# Patient Record
Sex: MALE | Race: ASIAN | Employment: UNEMPLOYED | URBAN - METROPOLITAN AREA
[De-identification: names, ages, dates, MRNs, and addresses within clinical notes are randomized per-mention and may not be internally consistent; named-entity substitution may affect disease eponyms.]

---

## 2019-06-24 ENCOUNTER — HOSPITAL ENCOUNTER (EMERGENCY)
Age: 49
Discharge: HOME | End: 2019-06-24
Payer: MEDICAID

## 2019-06-24 ENCOUNTER — APPOINTMENT (OUTPATIENT)
Dept: CT IMAGING | Age: 49
End: 2019-06-24
Payer: MEDICAID

## 2019-06-24 VITALS
SYSTOLIC BLOOD PRESSURE: 120 MMHG | DIASTOLIC BLOOD PRESSURE: 85 MMHG | WEIGHT: 115 LBS | TEMPERATURE: 97.5 F | BODY MASS INDEX: 20.38 KG/M2 | OXYGEN SATURATION: 99 % | HEIGHT: 63 IN | HEART RATE: 55 BPM | RESPIRATION RATE: 14 BRPM

## 2019-06-24 DIAGNOSIS — R74.8 ELEVATED LIPASE: ICD-10-CM

## 2019-06-24 DIAGNOSIS — G89.29 CHRONIC ABDOMINAL PAIN: ICD-10-CM

## 2019-06-24 DIAGNOSIS — K76.9 LESION OF LIVER: ICD-10-CM

## 2019-06-24 DIAGNOSIS — G44.209 TENSION HEADACHE: Primary | ICD-10-CM

## 2019-06-24 DIAGNOSIS — R10.9 CHRONIC ABDOMINAL PAIN: ICD-10-CM

## 2019-06-24 LAB
A/G RATIO: 1.6 (ref 1.1–2.2)
ALBUMIN SERPL-MCNC: 3.9 G/DL (ref 3.4–5)
ALP BLD-CCNC: 74 U/L (ref 40–129)
ALT SERPL-CCNC: 25 U/L (ref 10–40)
ANION GAP SERPL CALCULATED.3IONS-SCNC: 12 MMOL/L (ref 3–16)
AST SERPL-CCNC: 30 U/L (ref 15–37)
BASOPHILS ABSOLUTE: 0.1 K/UL (ref 0–0.2)
BASOPHILS RELATIVE PERCENT: 0.8 %
BILIRUB SERPL-MCNC: 0.4 MG/DL (ref 0–1)
BUN BLDV-MCNC: 32 MG/DL (ref 7–20)
CALCIUM SERPL-MCNC: 9 MG/DL (ref 8.3–10.6)
CHLORIDE BLD-SCNC: 104 MMOL/L (ref 99–110)
CO2: 24 MMOL/L (ref 21–32)
CREAT SERPL-MCNC: 1.3 MG/DL (ref 0.9–1.3)
EOSINOPHILS ABSOLUTE: 0.3 K/UL (ref 0–0.6)
EOSINOPHILS RELATIVE PERCENT: 4.3 %
GFR AFRICAN AMERICAN: >60
GFR NON-AFRICAN AMERICAN: 59
GLOBULIN: 2.4 G/DL
GLUCOSE BLD-MCNC: 79 MG/DL (ref 70–99)
HCT VFR BLD CALC: 44.5 % (ref 40.5–52.5)
HEMOGLOBIN: 15.4 G/DL (ref 13.5–17.5)
LIPASE: 88 U/L (ref 13–60)
LYMPHOCYTES ABSOLUTE: 2.8 K/UL (ref 1–5.1)
LYMPHOCYTES RELATIVE PERCENT: 36.4 %
MCH RBC QN AUTO: 32.5 PG (ref 26–34)
MCHC RBC AUTO-ENTMCNC: 34.5 G/DL (ref 31–36)
MCV RBC AUTO: 94.2 FL (ref 80–100)
MONOCYTES ABSOLUTE: 0.5 K/UL (ref 0–1.3)
MONOCYTES RELATIVE PERCENT: 6.4 %
NEUTROPHILS ABSOLUTE: 4.1 K/UL (ref 1.7–7.7)
NEUTROPHILS RELATIVE PERCENT: 52.1 %
PDW BLD-RTO: 13 % (ref 12.4–15.4)
PLATELET # BLD: 175 K/UL (ref 135–450)
PMV BLD AUTO: 7.8 FL (ref 5–10.5)
POTASSIUM REFLEX MAGNESIUM: 3.9 MMOL/L (ref 3.5–5.1)
RBC # BLD: 4.73 M/UL (ref 4.2–5.9)
SODIUM BLD-SCNC: 140 MMOL/L (ref 136–145)
TOTAL PROTEIN: 6.3 G/DL (ref 6.4–8.2)
WBC # BLD: 7.8 K/UL (ref 4–11)

## 2019-06-24 PROCEDURE — 6360000004 HC RX CONTRAST MEDICATION: Performed by: PHYSICIAN ASSISTANT

## 2019-06-24 PROCEDURE — 74177 CT ABD & PELVIS W/CONTRAST: CPT

## 2019-06-24 PROCEDURE — 83690 ASSAY OF LIPASE: CPT

## 2019-06-24 PROCEDURE — 6360000002 HC RX W HCPCS: Performed by: PHYSICIAN ASSISTANT

## 2019-06-24 PROCEDURE — 99284 EMERGENCY DEPT VISIT MOD MDM: CPT

## 2019-06-24 PROCEDURE — 80053 COMPREHEN METABOLIC PANEL: CPT

## 2019-06-24 PROCEDURE — 85025 COMPLETE CBC W/AUTO DIFF WBC: CPT

## 2019-06-24 PROCEDURE — 96374 THER/PROPH/DIAG INJ IV PUSH: CPT

## 2019-06-24 RX ORDER — KETOROLAC TROMETHAMINE 30 MG/ML
15 INJECTION, SOLUTION INTRAMUSCULAR; INTRAVENOUS ONCE
Status: COMPLETED | OUTPATIENT
Start: 2019-06-24 | End: 2019-06-24

## 2019-06-24 RX ADMIN — IOPAMIDOL 75 ML: 755 INJECTION, SOLUTION INTRAVENOUS at 16:16

## 2019-06-24 RX ADMIN — KETOROLAC TROMETHAMINE 15 MG: 30 INJECTION, SOLUTION INTRAMUSCULAR at 15:42

## 2019-06-24 SDOH — HEALTH STABILITY: MENTAL HEALTH: HOW OFTEN DO YOU HAVE A DRINK CONTAINING ALCOHOL?: NEVER

## 2019-06-24 ASSESSMENT — ENCOUNTER SYMPTOMS
ABDOMINAL PAIN: 1
SHORTNESS OF BREATH: 0
BLOOD IN STOOL: 0
NAUSEA: 0
CONSTIPATION: 0
ABDOMINAL DISTENTION: 0
ANAL BLEEDING: 0
BACK PAIN: 0
COLOR CHANGE: 0
COUGH: 0
DIARRHEA: 0
VOMITING: 0
PHOTOPHOBIA: 0

## 2019-06-24 ASSESSMENT — PAIN DESCRIPTION - ORIENTATION: ORIENTATION: LEFT;LOWER

## 2019-06-24 ASSESSMENT — PAIN SCALES - GENERAL: PAINLEVEL_OUTOF10: 10

## 2019-06-24 ASSESSMENT — PAIN DESCRIPTION - LOCATION: LOCATION: HEAD;ABDOMEN

## 2019-06-24 NOTE — ED PROVIDER NOTES
radiologist. Emely BECK PA have directly visualized the radiologic plain film image(s) with the below findings:  Interpretation per the Radiologist below, if available at the time of this note:    CT ABDOMEN PELVIS W IV CONTRAST Additional Contrast? None   Final Result   No acute abnormality identified. No imaging evidence for acute pancreatitis. Hyperenhancing liver lesion in the dome of the left hepatic lobe. This may   represent a benign perfusion abnormality versus flash filling hemangioma. If   further evaluation is necessary, contrast-enhanced MRI would be the   recommended imaging evaluation. MEDICAL DECISION MAKING / ED COURSE:      PROCEDURES:   Procedures    Patient was given:  Medications   ketorolac (TORADOL) injection 15 mg (15 mg Intravenous Given 6/24/19 1542)   iopamidol (ISOVUE-370) 76 % injection 75 mL (75 mLs Intravenous Given 6/24/19 1616)   This is a  54 y.o. male who presents to the emergency department with complaints of headache that started 3 days ago off and on. As well as pain in his left upper abdomen ongoing for 8 years. Patient was in the emergency department visiting a family friend and thought he would also get checked out at this time. Patient speaks Arlean Ligas but has a friend at bedside for translation and patient is also agreeable to this. Denies any fevers, chills, visual changes, numbness, tingling or weakness. Patient is sleeping at time of initial evaluation. He is active and smiling. Patient denies any medical problems. He states that he did take medication for headache was unsure what he took. This is not patient's worst headache of his life. In regards to his abdominal pain this is been going on for 8 years, states that he is never had this checked out. On exam he does laugh with palpation to his abdomen. Denies vomiting, diarrhea, constipation or urinary symptoms.   Denies any aggravating or alleviating factors in regards to both headache and abdominal pain. On exam patient is well-appearing, he is sleeping on initial evaluation but is also smiling afterwards. On abdominal exam he does laugh. I do not feel any mass and he has negative peritoneal signs. Vitals are all currently stable. Patient has been taking medicine for headache but unsure what this medicine is. Patient will receive IV Toradol. Will check CBC, chemistry and lipase. He has normal range of motion of his neck and no evidence of neck stiffness. He is afebrile low suspicion for meningitis. Low suspicion for intracranial bleeding, neurologically intact. I do not believe any neuro imaging is indicated at this time. I do believe patient just wanted to be checked out since he was here in the hospital with family friend. In regards to his abdominal pain going on for 8 years with normal blood work I do not believe any imaging is indicated as this is something that is chronic. Patient will refer to PCP for outpatient follow-up. Patient is otherwise well-appearing prior to discharge. The patient tolerated their visit well. I have evaluated the patient with physician available for consultation as needed. I have discussed the findings of today's workup with the patient and addressed the patient's questions and concerns. Important warning signs as well as new or worsening symptoms which wouldnecessitate immediate return to the ED were discussed. The plan is to discharge from the ED at this time, and the patient is in stable condition. The patient acknowledged understanding is agreeable with this plan. CLINICAL IMPRESSION:  1. Tension headache    2. Chronic abdominal pain    3. Lesion of liver    4. Elevated lipase        DISPOSITION Decision To Discharge 06/24/2019 04:41:23 PM      PATIENT REFERRED TO:  No follow-up provider specified. DISCHARGE MEDICATIONS:  There are no discharge medications for this patient.              (Please note the MDM and HPI sections of

## 2019-06-24 NOTE — ED NOTES
IV started and labs obtained per order. Pt tolerated well. Pt connected to monitors and BP set to cycle. Updated pt and friend on POC; verbalized understanding. Siderails up x 2, call light within reach. Pt denies any further needs at this time. Will continue to monitor.         Marquis Hood RN  06/24/19 9965

## 2021-03-27 ENCOUNTER — HOSPITAL ENCOUNTER (EMERGENCY)
Age: 51
Discharge: HOME | End: 2021-03-28
Attending: EMERGENCY MEDICINE
Payer: MEDICAID

## 2021-03-27 DIAGNOSIS — S81.012A KNEE LACERATION, LEFT, INITIAL ENCOUNTER: Primary | ICD-10-CM
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
## 2021-03-27 DIAGNOSIS — Z23 NEED FOR TETANUS BOOSTER: ICD-10-CM

## 2021-03-27 PROCEDURE — 99284 EMERGENCY DEPT VISIT MOD MDM: CPT

## 2021-03-27 PROCEDURE — 99283 EMERGENCY DEPT VISIT LOW MDM: CPT

## 2021-03-28 ENCOUNTER — APPOINTMENT (OUTPATIENT)
Dept: GENERAL RADIOLOGY | Age: 51
End: 2021-03-28
Payer: MEDICAID

## 2021-03-28 VITALS
BODY MASS INDEX: 21.26 KG/M2 | SYSTOLIC BLOOD PRESSURE: 129 MMHG | WEIGHT: 120 LBS | RESPIRATION RATE: 18 BRPM | DIASTOLIC BLOOD PRESSURE: 78 MMHG | HEIGHT: 63 IN | OXYGEN SATURATION: 98 % | TEMPERATURE: 97.8 F | HEART RATE: 78 BPM

## 2021-03-28 PROCEDURE — 6370000000 HC RX 637 (ALT 250 FOR IP): Performed by: PHYSICIAN ASSISTANT

## 2021-03-28 PROCEDURE — 73562 X-RAY EXAM OF KNEE 3: CPT

## 2021-03-28 PROCEDURE — 90715 TDAP VACCINE 7 YRS/> IM: CPT | Performed by: PHYSICIAN ASSISTANT

## 2021-03-28 PROCEDURE — 90471 IMMUNIZATION ADMIN: CPT | Performed by: PHYSICIAN ASSISTANT

## 2021-03-28 PROCEDURE — 6360000002 HC RX W HCPCS: Performed by: PHYSICIAN ASSISTANT

## 2021-03-28 RX ORDER — IBUPROFEN 800 MG/1
800 TABLET ORAL EVERY 8 HOURS PRN
Qty: 30 TABLET | Refills: 0 | Status: SHIPPED | OUTPATIENT
Start: 2021-03-28

## 2021-03-28 RX ORDER — CEPHALEXIN 500 MG/1
500 CAPSULE ORAL 4 TIMES DAILY
Qty: 28 CAPSULE | Refills: 0 | Status: SHIPPED | OUTPATIENT
Start: 2021-03-28 | End: 2021-04-04

## 2021-03-28 RX ORDER — SULFAMETHOXAZOLE AND TRIMETHOPRIM 800; 160 MG/1; MG/1
1 TABLET ORAL 2 TIMES DAILY
Qty: 14 TABLET | Refills: 0 | Status: SHIPPED | OUTPATIENT
Start: 2021-03-28 | End: 2021-04-04

## 2021-03-28 RX ORDER — CEPHALEXIN 250 MG/1
500 CAPSULE ORAL ONCE
Status: COMPLETED | OUTPATIENT
Start: 2021-03-28 | End: 2021-03-28

## 2021-03-28 RX ORDER — IBUPROFEN 600 MG/1
600 TABLET ORAL ONCE
Status: COMPLETED | OUTPATIENT
Start: 2021-03-28 | End: 2021-03-28

## 2021-03-28 RX ORDER — ACETAMINOPHEN 325 MG/1
650 TABLET ORAL ONCE
Status: COMPLETED | OUTPATIENT
Start: 2021-03-28 | End: 2021-03-28

## 2021-03-28 RX ORDER — SULFAMETHOXAZOLE AND TRIMETHOPRIM 800; 160 MG/1; MG/1
1 TABLET ORAL ONCE
Status: COMPLETED | OUTPATIENT
Start: 2021-03-28 | End: 2021-03-28

## 2021-03-28 RX ADMIN — SULFAMETHOXAZOLE AND TRIMETHOPRIM 1 TABLET: 800; 160 TABLET ORAL at 01:48

## 2021-03-28 RX ADMIN — TETANUS TOXOID, REDUCED DIPHTHERIA TOXOID AND ACELLULAR PERTUSSIS VACCINE, ADSORBED 0.5 ML: 5; 2.5; 8; 8; 2.5 SUSPENSION INTRAMUSCULAR at 01:48

## 2021-03-28 RX ADMIN — ACETAMINOPHEN 650 MG: 325 TABLET ORAL at 01:48

## 2021-03-28 RX ADMIN — IBUPROFEN 600 MG: 600 TABLET, FILM COATED ORAL at 01:50

## 2021-03-28 RX ADMIN — CEPHALEXIN 500 MG: 250 CAPSULE ORAL at 01:48

## 2021-03-28 ASSESSMENT — ENCOUNTER SYMPTOMS
NAUSEA: 0
ABDOMINAL PAIN: 0
VOMITING: 0
SHORTNESS OF BREATH: 0
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
## 2021-03-28 NOTE — ED NOTES
Crutches given and instructions given using the  line. Also instructed to follow up with wound care and to keep dressing intact to left knee till then. Also to keep knee straight.        Blessing Villatoro RN  03/28/21 1071
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
## 2021-04-05 ENCOUNTER — HOSPITAL ENCOUNTER (OUTPATIENT)
Dept: WOUND CARE | Age: 51
Discharge: HOME | End: 2021-04-05
Payer: MEDICAID

## 2021-04-05 VITALS
WEIGHT: 137 LBS | BODY MASS INDEX: 24.27 KG/M2 | DIASTOLIC BLOOD PRESSURE: 97 MMHG | TEMPERATURE: 97.3 F | SYSTOLIC BLOOD PRESSURE: 125 MMHG | HEART RATE: 87 BPM | RESPIRATION RATE: 16 BRPM

## 2021-04-05 DIAGNOSIS — S81.012A LACERATION OF LEFT KNEE, INITIAL ENCOUNTER: ICD-10-CM

## 2021-04-05 DIAGNOSIS — S81.002A OPEN KNEE WOUND, LEFT, INITIAL ENCOUNTER: Primary | ICD-10-CM

## 2021-04-05 PROCEDURE — 11042 DBRDMT SUBQ TIS 1ST 20SQCM/<: CPT | Performed by: NURSE PRACTITIONER

## 2021-04-05 PROCEDURE — 11042 DBRDMT SUBQ TIS 1ST 20SQCM/<: CPT

## 2021-04-05 PROCEDURE — 99213 OFFICE O/P EST LOW 20 MIN: CPT

## 2021-04-05 PROCEDURE — 99202 OFFICE O/P NEW SF 15 MIN: CPT | Performed by: NURSE PRACTITIONER

## 2021-04-05 RX ORDER — LIDOCAINE HYDROCHLORIDE 20 MG/ML
JELLY TOPICAL ONCE
Status: CANCELLED | OUTPATIENT
Start: 2021-04-05 | End: 2021-04-05

## 2021-04-05 RX ORDER — BACITRACIN, NEOMYCIN, POLYMYXIN B 400; 3.5; 5 [USP'U]/G; MG/G; [USP'U]/G
OINTMENT TOPICAL ONCE
Status: CANCELLED | OUTPATIENT
Start: 2021-04-05 | End: 2021-04-05

## 2021-04-05 RX ORDER — BETAMETHASONE DIPROPIONATE 0.05 %
OINTMENT (GRAM) TOPICAL ONCE
Status: CANCELLED | OUTPATIENT
Start: 2021-04-05 | End: 2021-04-05

## 2021-04-05 RX ORDER — BACITRACIN ZINC AND POLYMYXIN B SULFATE 500; 1000 [USP'U]/G; [USP'U]/G
OINTMENT TOPICAL ONCE
Status: CANCELLED | OUTPATIENT
Start: 2021-04-05 | End: 2021-04-05

## 2021-04-05 RX ORDER — GINSENG 100 MG
CAPSULE ORAL ONCE
Status: CANCELLED | OUTPATIENT
Start: 2021-04-05 | End: 2021-04-05

## 2021-04-05 RX ORDER — LIDOCAINE HYDROCHLORIDE 40 MG/ML
SOLUTION TOPICAL ONCE
Status: CANCELLED | OUTPATIENT
Start: 2021-04-05 | End: 2021-04-05

## 2021-04-05 RX ORDER — LIDOCAINE 50 MG/G
OINTMENT TOPICAL ONCE
Status: CANCELLED | OUTPATIENT
Start: 2021-04-05 | End: 2021-04-05

## 2021-04-05 RX ORDER — GENTAMICIN SULFATE 1 MG/G
OINTMENT TOPICAL ONCE
Status: CANCELLED | OUTPATIENT
Start: 2021-04-05 | End: 2021-04-05

## 2021-04-05 RX ORDER — CLOBETASOL PROPIONATE 0.5 MG/G
OINTMENT TOPICAL ONCE
Status: CANCELLED | OUTPATIENT
Start: 2021-04-05 | End: 2021-04-05

## 2021-04-05 RX ORDER — LIDOCAINE 40 MG/G
CREAM TOPICAL ONCE
Status: DISCONTINUED | OUTPATIENT
Start: 2021-04-05 | End: 2021-04-06 | Stop reason: HOSPADM

## 2021-04-05 RX ORDER — LIDOCAINE 40 MG/G
CREAM TOPICAL ONCE
Status: CANCELLED | OUTPATIENT
Start: 2021-04-05 | End: 2021-04-05

## 2021-04-05 NOTE — PROGRESS NOTES
[facility],[address]:      [address] f: [phone] f: [phone]: [phone] [email or web address]     Ordering Center: Mauricio Lees [address]  [facility] [address]  [phone]  Dept: [phone]   Fax# [phone]    Patient Information:      Joleen Sue [facility]   [phone]   : 1970  AGE: 54 y.o. GENDER: male   TODAYS DATE:  2021    Insurance:      PRIMARY INSURANCE:  Plan: [employer] COMM[employer] PLAN  Coverage: [facility]  Effective Date: 2019  Group Number: [unfilled]  Subscriber Number: [id number] - (Medicaid Managed)    Payor/Plan Subscr  Sex Relation Sub. Ins. ID Effective Group Num   1. [id number] [facility] B 1970 Male Self [id number]                                   [address]         Patient Wound Information:     Additional ICD-10 Codes:S81.002A    There is no problem list on file for this patient. WOUNDS REQUIRING DRESSING SUPPLIES:     Wound 21 Knee Anterior; Left #1 (Active)   Wound Image   21 0916   Wound Etiology Traumatic 21   Wound Cleansed Cleansed with saline 21   Wound Length (cm) 3.5 cm 21 0916   Wound Width (cm) 3.1 cm 2116   Wound Depth (cm) 0.1 cm 21   Wound Surface Area (cm^2) 10.85 cm^2 21 0916   Wound Volume (cm^3) 1.08 cm^3 21   Post-Procedure Length (cm) 3.6 cm 21   Post-Procedure Width (cm) 3.2 cm 21   Post-Procedure Depth (cm) 0.1 cm 21   Post-Procedure Surface Area (cm^2) 11.52 cm^2 21   Post-Procedure Volume (cm^3) 1.15 cm^3 21   Undermining Starts ___ O'Clock 6 21   Undermining Ends___ O'Clock 12 21   Undermining Maxium Distance (cm) 0.3 21   Wound Assessment Granulation tissue;Bleeding;Slough 21 Drainage Amount Moderate 04/05/21 0916   Drainage Description Serosanguinous 04/05/21 0916   Odor None 04/05/21 0916   Lyudmila-wound Assessment Intact 04/05/21 0916   Margins Attached edges; Defined edges 04/05/21 0916   Wound Thickness Description not for Pressure Injury Full thickness 04/05/21 0916   Number of days: 0          Supplies Requested :      WOUND #: 1   PRIMARY DRESSING:    None   Cover and Secure with: 4X4 gauze pad  Other Mepitel     FREQUENCY OF DRESSING CHANGES:  Daily    Wound Thickness [x] Full   []Partial                                     Patient Wound(s) Debrided: [x] Yes   [] No    Debridement Date: 4/5/2021    Debribement Type: Excisional/Sharp    ADDITIONAL ITEMS:  [] Gloves Small  [x] Gloves Medium [] Gloves Large [] Gloves [facility]  [] Paper Tape 1\" [] Paper Tape 2\" [] Paper Tape 3\"  [x] Medipore Tape 3\"  [x] Saline  [] Skin Prep   [] Adhesive Remover   [] Cotton Tip Applicators  [] Tubular Stocking   [] Size E  [] Size G  [] Other:    Patient currently being seen by Home Health: [] Yes   [x] No    Duration for needed supplies:  []15  [x]30  []60  []90 Days    Provider Information:      PROVIDER'S NAME/NPI  Andria Alcantara  [id number]    I give permission to coordinate the care for this patient

## 2021-04-05 NOTE — PROGRESS NOTES
SYSTEMS    Pertinent items are noted in HPI. Objective:      BP (!) 125/97   Pulse 87   Temp 97.3 °F (36.3 °C) (Temporal)   Resp 16   Wt 137 lb (62.1 kg)   BMI 24.27 kg/m²     PHYSICAL EXAM    General Appearance: alert and oriented to person, place and time, well-developed and well-nourished, in no acute distress  Skin: warm and dry, no rash or erythema  Head: normocephalic and atraumatic  Eyes: pupils equal, round, and reactive to light  Pulmonary/Chest: clear to auscultation bilaterally- no wheezes, rales or rhonchi, normal air movement, no respiratory distress  Cardiovascular: normal rate, regular rhythm and intact distal pulses  Extremities: no cyanosis, no clubbing and no edema  Wound: red, moist red wound base with depth to wound at first no noted. No pain with probing depth and granulated to red granular tissue. Assessment:     Patient Active Problem List   Diagnosis    Open knee wound, left, initial encounter    Laceration of left knee       Procedure Note    Performed by: RONNIE Zuñiga CNP    Consent obtained: Yes    Time out taken:  Yes    Pain Control: Anesthetic  Anesthetic: 4% Lidocaine Cream     Debridement:Excisional Debridement    Using curette, #15 blade scalpel and forceps the wound was sharply debrided    down through and including the removal of epidermis, dermis and subcutaneous tissue. Devitalized Tissue Debrided:  fibrin, biofilm and slough    Pre Debridement Measurements:  Are located in the Wound Documentation Flow Sheet    Wound #: 1     Post  Debridement Measurements:  Wound 04/05/21 Knee Anterior; Left #1 (Active)   Wound Image   04/05/21 0916   Wound Etiology Traumatic 04/05/21 0916   Dressing Status New dressing applied 04/05/21 1010   Wound Cleansed Cleansed with saline 04/05/21 0916   Dressing/Treatment Antibacterial ointment;Dry dressing;Non adherent 04/05/21 1010   Wound Length (cm) 3.5 cm 04/05/21 0916   Wound Width (cm) 3.1 cm 04/05/21 0916 Wound Depth (cm) 0.1 cm 04/05/21 0916   Wound Surface Area (cm^2) 10.85 cm^2 04/05/21 0916   Wound Volume (cm^3) 1.08 cm^3 04/05/21 0916   Post-Procedure Length (cm) 3.6 cm 04/05/21 0927   Post-Procedure Width (cm) 3.2 cm 04/05/21 0927   Post-Procedure Depth (cm) 0.1 cm 04/05/21 0927   Post-Procedure Surface Area (cm^2) 11.52 cm^2 04/05/21 0927   Post-Procedure Volume (cm^3) 1.15 cm^3 04/05/21 0927   Undermining Starts ___ O'Clock 6 04/05/21 0916   Undermining Ends___ O'Clock 12 04/05/21 0916   Undermining Maxium Distance (cm) 0.3 04/05/21 0916   Wound Assessment Granulation tissue;Bleeding;Slough 04/05/21 0916   Drainage Amount Moderate 04/05/21 0916   Drainage Description Serosanguinous 04/05/21 0916   Odor None 04/05/21 0916   Lyudmila-wound Assessment Intact 04/05/21 0916   Margins Attached edges; Defined edges 04/05/21 0916   Wound Thickness Description not for Pressure Injury Full thickness 04/05/21 0916   Number of days: 0           Total Surface Area Debrided:  11.52 sq cm     Percentage of wound debrided 100 %    Bleeding:  Minimal    Hemostasis Achieved:  not needed    Procedural Pain:  1  / 10     Post Procedural Pain:  0 / 10     Response to treatment:  Well tolerated by patient. Plan:     The nature of the patient's condition was explained in depth. The patient was informed that their compliance to the treatment plan is paramount to successful healing and prevention of further ulceration and/or infection. Pt/family education per provider via  related to moist wound healing and current plan of care. All agreeable to plan and will come back next week for follow-up     Discharge Treatment Wound 04/05/21 Knee Anterior; Left #1-Dressing/Treatment: Antibacterial ointment, Dry dressing, Non adherent    Written Patient Discharge Instructions Given            Electronically signed by RONNIE Power CNP on 4/5/2021 at 11:06 AM

## 2021-04-05 NOTE — PLAN OF CARE
Discharge instructions given. Patient verbalized understanding. Return to [facility] in 1 week.   Called/faxed orders to

## 2021-04-12 ENCOUNTER — HOSPITAL ENCOUNTER (OUTPATIENT)
Dept: WOUND CARE | Age: 51
Discharge: HOME | End: 2021-04-12
Payer: MEDICAID

## 2022-12-03 ENCOUNTER — APPOINTMENT (OUTPATIENT)
Dept: GENERAL RADIOLOGY | Age: 52
End: 2022-12-03
Payer: COMMERCIAL

## 2022-12-03 ENCOUNTER — HOSPITAL ENCOUNTER (EMERGENCY)
Age: 52
Discharge: HOME | End: 2022-12-03
Attending: EMERGENCY MEDICINE
Payer: COMMERCIAL

## 2022-12-03 ENCOUNTER — APPOINTMENT (OUTPATIENT)
Dept: CT IMAGING | Age: 52
End: 2022-12-03
Payer: COMMERCIAL

## 2022-12-03 VITALS
WEIGHT: 140 LBS | HEIGHT: 61 IN | HEART RATE: 93 BPM | TEMPERATURE: 97.9 F | DIASTOLIC BLOOD PRESSURE: 89 MMHG | BODY MASS INDEX: 26.43 KG/M2 | OXYGEN SATURATION: 100 % | RESPIRATION RATE: 20 BRPM | SYSTOLIC BLOOD PRESSURE: 136 MMHG

## 2022-12-03 DIAGNOSIS — R56.9 SEIZURE (HCC): Primary | ICD-10-CM

## 2022-12-03 DIAGNOSIS — E87.6 HYPOKALEMIA: ICD-10-CM

## 2022-12-03 LAB
A/G RATIO: 1.6 (ref 1.1–2.2)
ALBUMIN SERPL-MCNC: 4.1 G/DL (ref 3.4–5)
ALP BLD-CCNC: 110 U/L (ref 40–129)
ALT SERPL-CCNC: 29 U/L (ref 10–40)
ANION GAP SERPL CALCULATED.3IONS-SCNC: 23 MMOL/L (ref 3–16)
AST SERPL-CCNC: 32 U/L (ref 15–37)
BACTERIA: NORMAL /HPF
BASOPHILS ABSOLUTE: 0.1 K/UL (ref 0–0.2)
BASOPHILS RELATIVE PERCENT: 0.5 %
BILIRUB SERPL-MCNC: 0.4 MG/DL (ref 0–1)
BILIRUBIN URINE: NEGATIVE
BLOOD, URINE: NEGATIVE
BUN BLDV-MCNC: 21 MG/DL (ref 7–20)
CALCIUM SERPL-MCNC: 9.5 MG/DL (ref 8.3–10.6)
CHLORIDE BLD-SCNC: 97 MMOL/L (ref 99–110)
CHP ED QC CHECK: NORMAL
CHP ED QC CHECK: NORMAL
CLARITY: CLEAR
CO2: 17 MMOL/L (ref 21–32)
COLOR: YELLOW
CREAT SERPL-MCNC: 1.4 MG/DL (ref 0.9–1.3)
EOSINOPHILS ABSOLUTE: 0.4 K/UL (ref 0–0.6)
EOSINOPHILS RELATIVE PERCENT: 3.4 %
EPITHELIAL CELLS, UA: 0 /HPF (ref 0–5)
GFR SERPL CREATININE-BSD FRML MDRD: >60 ML/MIN/{1.73_M2}
GLUCOSE BLD-MCNC: 114 MG/DL (ref 70–99)
GLUCOSE BLD-MCNC: 125 MG/DL
GLUCOSE BLD-MCNC: 125 MG/DL (ref 70–99)
GLUCOSE BLD-MCNC: 181 MG/DL
GLUCOSE BLD-MCNC: 181 MG/DL (ref 70–99)
GLUCOSE URINE: 250 MG/DL
HCT VFR BLD CALC: 44.9 % (ref 40.5–52.5)
HEMOGLOBIN: 15.2 G/DL (ref 13.5–17.5)
HYALINE CASTS: 2 /LPF (ref 0–8)
KETONES, URINE: NEGATIVE MG/DL
LEUKOCYTE ESTERASE, URINE: NEGATIVE
LYMPHOCYTES ABSOLUTE: 4.4 K/UL (ref 1–5.1)
LYMPHOCYTES RELATIVE PERCENT: 39.8 %
MAGNESIUM: 2.3 MG/DL (ref 1.8–2.4)
MCH RBC QN AUTO: 30.9 PG (ref 26–34)
MCHC RBC AUTO-ENTMCNC: 33.9 G/DL (ref 31–36)
MCV RBC AUTO: 91.3 FL (ref 80–100)
MICROSCOPIC EXAMINATION: YES
MONOCYTES ABSOLUTE: 0.7 K/UL (ref 0–1.3)
MONOCYTES RELATIVE PERCENT: 6.3 %
NEUTROPHILS ABSOLUTE: 5.5 K/UL (ref 1.7–7.7)
NEUTROPHILS RELATIVE PERCENT: 50 %
NITRITE, URINE: NEGATIVE
PDW BLD-RTO: 13.5 % (ref 12.4–15.4)
PERFORMED ON: ABNORMAL
PERFORMED ON: ABNORMAL
PH UA: 6 (ref 5–8)
PLATELET # BLD: 211 K/UL (ref 135–450)
PMV BLD AUTO: 7.9 FL (ref 5–10.5)
POTASSIUM REFLEX MAGNESIUM: 3.4 MMOL/L (ref 3.5–5.1)
PRO-BNP: 25 PG/ML (ref 0–124)
PROTEIN UA: ABNORMAL MG/DL
RBC # BLD: 4.92 M/UL (ref 4.2–5.9)
RBC UA: 0 /HPF (ref 0–4)
SODIUM BLD-SCNC: 137 MMOL/L (ref 136–145)
SPECIFIC GRAVITY UA: 1.02 (ref 1–1.03)
TOTAL CK: 194 U/L (ref 39–308)
TOTAL PROTEIN: 6.6 G/DL (ref 6.4–8.2)
TROPONIN: <0.01 NG/ML
URINE REFLEX TO CULTURE: ABNORMAL
URINE TYPE: ABNORMAL
UROBILINOGEN, URINE: 0.2 E.U./DL
WBC # BLD: 10.9 K/UL (ref 4–11)
WBC UA: 0 /HPF (ref 0–5)

## 2022-12-03 PROCEDURE — 99285 EMERGENCY DEPT VISIT HI MDM: CPT

## 2022-12-03 PROCEDURE — 6370000000 HC RX 637 (ALT 250 FOR IP): Performed by: PHYSICIAN ASSISTANT

## 2022-12-03 PROCEDURE — 36415 COLL VENOUS BLD VENIPUNCTURE: CPT

## 2022-12-03 PROCEDURE — 85025 COMPLETE CBC W/AUTO DIFF WBC: CPT

## 2022-12-03 PROCEDURE — 81001 URINALYSIS AUTO W/SCOPE: CPT

## 2022-12-03 PROCEDURE — 93005 ELECTROCARDIOGRAM TRACING: CPT | Performed by: PHYSICIAN ASSISTANT

## 2022-12-03 PROCEDURE — 80053 COMPREHEN METABOLIC PANEL: CPT

## 2022-12-03 PROCEDURE — 82550 ASSAY OF CK (CPK): CPT

## 2022-12-03 PROCEDURE — 83735 ASSAY OF MAGNESIUM: CPT

## 2022-12-03 PROCEDURE — 70450 CT HEAD/BRAIN W/O DYE: CPT

## 2022-12-03 PROCEDURE — 84484 ASSAY OF TROPONIN QUANT: CPT

## 2022-12-03 PROCEDURE — 71045 X-RAY EXAM CHEST 1 VIEW: CPT

## 2022-12-03 PROCEDURE — 83880 ASSAY OF NATRIURETIC PEPTIDE: CPT

## 2022-12-03 RX ORDER — ATORVASTATIN CALCIUM 40 MG/1
TABLET, FILM COATED ORAL
COMMUNITY
Start: 2022-11-17

## 2022-12-03 RX ADMIN — POTASSIUM BICARBONATE 20 MEQ: 782 TABLET, EFFERVESCENT ORAL at 17:18

## 2022-12-03 ASSESSMENT — ENCOUNTER SYMPTOMS
CONSTIPATION: 0
BACK PAIN: 0
COUGH: 0
VOMITING: 0
SHORTNESS OF BREATH: 0
NAUSEA: 0
PHOTOPHOBIA: 0
COLOR CHANGE: 0
DIARRHEA: 0
ABDOMINAL PAIN: 0
RESPIRATORY NEGATIVE: 1
CHEST TIGHTNESS: 0

## 2022-12-03 ASSESSMENT — PAIN - FUNCTIONAL ASSESSMENT: PAIN_FUNCTIONAL_ASSESSMENT: NONE - DENIES PAIN

## 2022-12-03 ASSESSMENT — LIFESTYLE VARIABLES: HOW OFTEN DO YOU HAVE A DRINK CONTAINING ALCOHOL: NEVER

## 2022-12-03 NOTE — ED PROVIDER NOTES
I independently performed a history and physical on Tyler Smiley. All diagnostic, treatment, and disposition decisions were made by myself in conjunction with the advanced practice provider. For further details of [facility]ald emergency department encounter, please see TANG Bashir's documentation. Patient is here because of a seizure that occurred during mealtime. Patient reportedly began shaking and then lost consciousness and fell over. EMS reports the patient was postictal on their arrival with a blood glucose of 68. They interpreted this as low and gave him glucose. Here patient is awake and alert and oriented. Neurologic baseline per family. The history I received was through [place] audio  from a family member. On exam heart regular rate and rhythm and abdomen benign and patient moving all extremities purposefully and following commands. EKG  The Ekg interpreted by me shows  sinus tachycardia, agyt=204   Axis is   Right axis deviation  QTc is  normal  Intervals and Durations are unremarkable. ST Segments: normal  No prior EKG available for comparison.     Labs Reviewed   COMPREHENSIVE METABOLIC PANEL W/ REFLEX TO MG FOR LOW K - Abnormal; Notable for the following components:       Result Value    Potassium reflex Magnesium 3.4 (*)     Chloride 97 (*)     CO2 17 (*)     Anion Gap 23 (*)     Glucose 114 (*)     BUN 21 (*)     Creatinine 1.4 (*)     All other components within normal limits   URINALYSIS WITH REFLEX TO CULTURE - Abnormal; Notable for the following components:    Glucose, Ur 250 (*)     Protein, UA TRACE (*)     All other components within normal limits   POCT GLUCOSE - Abnormal; Notable for the following components:    POC Glucose 125 (*)     All other components within normal limits   POCT GLUCOSE - Abnormal; Notable for the following components:    POC Glucose 181 (*)     All other components within normal limits   POCT GLUCOSE - Normal   POCT GLUCOSE - Normal   CK   CBC WITH AUTO DIFFERENTIAL   TROPONIN   BRAIN NATRIURETIC PEPTIDE   MAGNESIUM   MICROSCOPIC URINALYSIS   POCT GLUCOSE   POCT GLUCOSE     CT HEAD WO CONTRAST   Final Result   No acute intracranial abnormality. XR CHEST PORTABLE   Final Result   Low lung volumes with bibasilar atelectasis. I personally saw this patient and performed a substantive portion of the visit including all aspects of the medical decision making. MDM  No clear cause found for the patient's new onset seizure disorder. I discussed with the patient and caregiver regarding the need for MRI and EEG as an outpatient. Given patient has returned to baseline I feel he is appropriate for discharge. I estimate there is LOW risk for ACUTE CORONARY SYNDROME, INTRACRANIAL HEMORRHAGE, MALIGNANT DYSRHYTHMIA, MENINGITIS, PNEUMONIA, PULMONARY EMBOLISM, SEPSIS, SUBARACHNOID HEMORRHAGE, SUBDURAL HEMATOMA, STROKE, or URINARY TRACT INFECTION, thus I consider the discharge disposition reasonable. Alfredo Jose and I have discussed the diagnosis and risks, and we agree with discharging home to follow-up with their primary doctor. We also discussed returning to the Emergency Department immediately if new or worsening symptoms occur. We have discussed the symptoms which are most concerning (e.g., changing or worsening pain, weakness, vomiting, fever) that necessitate immediate return. Final Impression    1. Seizure ([address]    2. Hypokalemia        Blood pressure 136/89, pulse 93, temperature 97.9 °F (36.6 °C), temperature source Oral, resp. rate 20, height 5' 1\" (1.549 m), weight 140 lb (63.5 kg), SpO2 100 %.        Chitra Coreas MD  12/03/22 3506

## 2022-12-04 LAB
EKG ATRIAL RATE: 107 BPM
EKG DIAGNOSIS: NORMAL
EKG P AXIS: 62 DEGREES
EKG P-R INTERVAL: 158 MS
EKG Q-T INTERVAL: 336 MS
EKG QRS DURATION: 82 MS
EKG QTC CALCULATION (BAZETT): 448 MS
EKG R AXIS: 99 DEGREES
EKG T AXIS: 53 DEGREES
EKG VENTRICULAR RATE: 107 BPM

## 2022-12-04 PROCEDURE — 93010 ELECTROCARDIOGRAM REPORT: CPT | Performed by: INTERNAL MEDICINE

## 2022-12-04 NOTE — ED PROVIDER NOTES
[facility]        Pt Name: Isaias Ruiz  MRN: [id number]  Phylicia 1970  Date of evaluation: 12/3/2022  Provider: TANG Solis  PCP: No primary care provider on file. Note Started: 11:01 PM EST 12/3/22     I have seen and evaluated this patient with my supervising physician [address]       Chief Complaint   Patient presents with    Seizures     Arrived per [facility] EMS d/t seizure like activity while at work; post ic upon EMS arrival; BS 68 with glucose up to 146;        HISTORY OF PRESENT ILLNESS   (Location, Timing/Onset, Context/Setting, Quality, Duration, Modifying Factors, Severity, Associated Signs and Symptoms)  Note limiting factors. Chief Complaint: Seizure like activity    Isaias Ruiz is a 50 y.o. male with no significant past medical history who presents to the ED with complaint of seizure-like activity. He was at work. Was sitting down for lunch when apparently he had seizure-like activity. Lasted a couple minutes. Was witnessed by staff who is at bedside with patient at this time. He apparently was postictal.  Glucose was low per EMS at 68. He was given glucose and improved to 146. He denies any complaints at this time. He is not postictal at this time. He denies any headache. No history of seizures in the past.  Denies drug or alcohol usage. Denies lightheadedness, dizziness, visual changes, speech disturbances or numbness/tingling. He denies any oral laceration or tongue bite. He denies any blood in his mouth. Denies bowel or bladder incontinence. Denies chest pain, shortness of breath, palpitations, abdominal pain, nausea/vomiting, urinary symptoms or changes in bowel movements. Denies any fever or chills. Denies any rashes or lesions. Nursing Notes were all reviewed and agreed with or any disagreements were addressed in the HPI.     REVIEW OF SYSTEMS    (2-9 systems for level 4, 10 or more for level 5)     Review of Systems   Constitutional:  Negative for activity change, appetite change, chills, diaphoresis, fatigue and fever. Eyes:  Negative for photophobia and visual disturbance. Respiratory: Negative. Negative for cough, chest tightness and shortness of breath. Cardiovascular: Negative. Negative for chest pain, palpitations and leg swelling. Gastrointestinal:  Negative for abdominal pain, constipation, diarrhea, nausea and vomiting. Genitourinary:  Negative for decreased urine volume, difficulty urinating, dysuria, flank pain, frequency, hematuria and urgency. Musculoskeletal:  Negative for arthralgias, back pain, myalgias, neck pain and neck stiffness. Skin:  Negative for color change, pallor, rash and wound. Neurological:  Positive for seizures. Negative for dizziness, tremors, syncope, facial asymmetry, speech difficulty, weakness, light-headedness, numbness and headaches. Positives and Pertinent negatives as per HPI. Except as noted above in the ROS, all other systems were reviewed and negative. PAST MEDICAL HISTORY     Past Medical History:   Diagnosis Date    Hyperlipidemia     Laceration of left knee 4/5/2021    Open knee wound, left, initial encounter 4/5/2021         SURGICAL HISTORY   History reviewed. No pertinent surgical history. [address]       Discharge Medication List as of 12/3/2022  7:41 PM        CONTINUE these medications which have NOT CHANGED    Details   atorvastatin (LIPITOR) 40 MG tablet Historical Med      ibuprofen (ADVIL;MOTRIN) 800 MG tablet Take 1 tablet by mouth every 8 hours as needed for Pain, Disp-30 tablet, R-0Print               ALLERGIES     Patient has no known allergies. FAMILYHISTORY     History reviewed. No pertinent family history.        SOCIAL HISTORY       Social History     Tobacco Use    Smoking status: Some Days    Smokeless tobacco: Never   Vaping Use    Vaping Use: Never used Substance Use Topics    Alcohol use: Never    Drug use: Never       SCREENINGS    [place] Coma Scale  Eye Opening: Spontaneous  Best Verbal Response: Oriented  Best Motor Response: Obeys commands  [place] Coma Scale Score: 15        PHYSICAL EXAM    (up to 7 for level 4, 8 or more for level 5)     ED Triage Vitals [12/03/22 1508]   BP Temp Temp Source Heart Rate Resp SpO2 Height Weight   (!) 140/97 97.9 °F (36.6 °C) Oral (!) 118 20 95 % 5' 1\" (1.549 m) 140 lb (63.5 kg)       Physical Exam  Constitutional:       General: He is not in acute distress. Appearance: Normal appearance. He is well-developed. He is not ill-appearing, toxic-appearing or diaphoretic. HENT:      Head: Normocephalic and atraumatic. Right Ear: External ear normal.      Left Ear: External ear normal.      Nose: Nose normal. No congestion or rhinorrhea. Mouth/Throat:      Mouth: Mucous membranes are moist.      Pharynx: No oropharyngeal exudate or posterior oropharyngeal erythema. Eyes:      General:         Right eye: No discharge. Left eye: No discharge. Extraocular Movements: Extraocular movements intact. Conjunctiva/sclera: Conjunctivae normal.      Pupils: Pupils are equal, round, and reactive to light. Cardiovascular:      Rate and Rhythm: Normal rate and regular rhythm. Pulses: Normal pulses. Heart sounds: Normal heart sounds. No murmur heard. No friction rub. No gallop. Comments: 2+ radial pulses bilaterally. No pedal edema. No calf tenderness. No JVD  Pulmonary:      Effort: Pulmonary effort is normal. No respiratory distress. Breath sounds: Normal breath sounds. No stridor. No wheezing, rhonchi or rales. Chest:      Chest wall: No tenderness. Abdominal:      General: Abdomen is flat. Bowel sounds are normal. There is no distension. Palpations: Abdomen is soft. There is no mass. Tenderness: There is no abdominal tenderness.  There is no right CVA tenderness, left CVA tenderness, guarding or rebound. Hernia: No hernia is present. Musculoskeletal:         General: Normal range of motion. Cervical back: Normal range of motion and neck supple. No rigidity or tenderness. Lymphadenopathy:      Cervical: No cervical adenopathy. Skin:     General: Skin is warm and dry. Coloration: Skin is not pale. Findings: No erythema or rash. Neurological:      General: No focal deficit present. Mental Status: He is alert and oriented to person, place, and time. GCS: GCS eye subscore is 4. GCS verbal subscore is 5. GCS motor subscore is 6. Cranial Nerves: Cranial nerves 2-12 are intact. No cranial nerve deficit, dysarthria or facial asymmetry. Sensory: Sensation is intact. No sensory deficit. Motor: Motor function is intact. No weakness. Psychiatric:         Behavior: Behavior normal.       DIAGNOSTIC RESULTS   LABS:    Labs Reviewed   COMPREHENSIVE METABOLIC PANEL W/ REFLEX TO MG FOR LOW K - Abnormal; Notable for the following components:       Result Value    Potassium reflex Magnesium 3.4 (*)     Chloride 97 (*)     CO2 17 (*)     Anion Gap 23 (*)     Glucose 114 (*)     BUN 21 (*)     Creatinine 1.4 (*)     All other components within normal limits   URINALYSIS WITH REFLEX TO CULTURE - Abnormal; Notable for the following components:    Glucose, Ur 250 (*)     Protein, UA TRACE (*)     All other components within normal limits   POCT GLUCOSE - Abnormal; Notable for the following components:    POC Glucose 125 (*)     All other components within normal limits   POCT GLUCOSE - Abnormal; Notable for the following components:    POC Glucose 181 (*)     All other components within normal limits   POCT GLUCOSE - Normal   POCT GLUCOSE - Normal   CK   CBC WITH AUTO DIFFERENTIAL   TROPONIN   BRAIN NATRIURETIC PEPTIDE   MAGNESIUM   MICROSCOPIC URINALYSIS   POCT GLUCOSE   POCT GLUCOSE       When ordered only abnormal lab results are displayed. All other labs were within normal range or not returned as of this dictation. EKG: When ordered, EKG's are interpreted by the Emergency Department Physician in the absence of a cardiologist.  Please see their note for interpretation of EKG. RADIOLOGY:   Non-plain film images such as CT, Ultrasound and MRI are read by the radiologist. Plain radiographic images are visualized and preliminarily interpreted by the ED Provider with the below findings:        Interpretation per the Radiologist below, if available at the time of this note:    CT HEAD WO CONTRAST   Final Result   No acute intracranial abnormality. XR CHEST PORTABLE   Final Result   Low lung volumes with bibasilar atelectasis. MRI BRAIN WO CONTRAST    (Results Pending)     CT HEAD WO CONTRAST    Result Date: 12/3/2022  EXAMINATION: CT OF THE HEAD WITHOUT CONTRAST  12/3/2022 3:55 pm TECHNIQUE: CT of the head was performed without the administration of intravenous contrast. Automated exposure control, iterative reconstruction, and/or weight based adjustment of the mA/kV was utilized to reduce the radiation dose to as low as reasonably achievable. COMPARISON: None. HISTORY: ORDERING SYSTEM PROVIDED HISTORY: [facility] TECHNOLOGIST PROVIDED HISTORY: Has a \"code stroke\" or \"stroke alert\" been called? ->No Reason for exam:->[email or web address]@ Decision Support Exception - unselect if not a suspected or confirmed emergency medical condition->Emergency Medical Condition (MA) Reason for Exam: seizure Relevant Medical/Surgical History: Seizures (Arrived per [facility] EMS d/t seizure like activity while at work; post ic upon EMS arrival; BS 68 with glucose up to 146; ) FINDINGS: BRAIN/VENTRICLES: There is no acute intracranial hemorrhage, mass effect or midline shift. No abnormal extra-axial fluid collection. The gray-white differentiation is maintained without evidence of an acute infarct. There is no evidence of hydrocephalus.  ORBITS: The visualized portion of the orbits demonstrate no acute abnormality. SINUSES: The visualized paranasal sinuses and mastoid air cells demonstrate no acute abnormality. SOFT TISSUES/SKULL:  No acute abnormality of the visualized skull or soft tissues. No acute intracranial abnormality. XR CHEST PORTABLE    Result Date: 12/3/2022  EXAMINATION: ONE XRAY VIEW OF THE CHEST 12/3/2022 3:37 pm COMPARISON: None. HISTORY: ORDERING SYSTEM PROVIDED HISTORY: seizure TECHNOLOGIST PROVIDED HISTORY: Reason for exam:->seizure Reason for Exam: Seizures (Arrived per [facility] d/t seizure like activity while at work; post ic upon EMS arrival; BS 68 with glucose up to 146; ) FINDINGS: Low lung volumes with bronchovascular crowding and bibasilar subsegmental atelectasis. Normal cardiomediastinal silhouette. No pleural effusion or pneumothorax. No acute osseous abnormality. Low lung volumes with bibasilar atelectasis. PROCEDURES   Unless otherwise noted below, none     Procedures    CRITICAL CARE TIME       CONSULTS:  None      EMERGENCY DEPARTMENT COURSE and DIFFERENTIAL DIAGNOSIS/MDM:   Vitals:    Vitals:    12/03/22 1715 12/03/22 1717 12/03/22 1730 12/03/22 1746   BP: 114/75  100/88 136/89   Pulse:  93     Resp:       Temp:       TempSrc:       SpO2: 100%  100% 100%   Weight:       Height:           Patient was given the following medications:  Medications   potassium bicarb-citric acid (EFFER-K) effervescent tablet 20 mEq (20 mEq Oral Given 12/3/22 1718)         Is this patient to be included in the SEP-1 Core Measure due to severe sepsis or septic shock? No   Exclusion criteria - the patient is NOT to be included for SEP-1 Core Measure due to: Infection is not suspected    Patient is a 80-year-old male who presents to the ED with complaint of a seizure. He had a witnessed seizure while he was at lunch today. Apparently his blood sugar was low when EMS arrived.   They report was 76 and they report he gave him some glucose. He came to the emergency department he was initially postictal but the time I evaluated patient he is alert and oriented at baseline mental status per staff and family. History mainly through family/staff as patient speaks [place]. He has atraumatic head. Reassuring neurologic examination. Does not appear postictal at this time. He is afebrile stable vital signs. Glucose continue to be monitored here in the emergency department and no further drops. Urinalysis showed no acute abnormality. CK was normal.  CBC showed normal white count, hemoglobin and platelets. CMP showed CO2 of 17 with anion gap of 23 otherwise relatively unremarkable. Potassium 3.4 and replaced orally here in the emergency department. Troponin was normal.  BNP unremarkable. CT of the head unremarkable. Chest x-ray unremarkable. EKG inter by attending. Has not had seizure-like activity in the past.  Patient has reassuring work-up here in the emergency department and returned to baseline mental status and believe we safely discharge home with continued outpatient follow-up. Return precautions discussed. Do not believe antiepileptic medication indicated at this time given lack of seizure history in the past.  Low suspicion for intracranial hemorrhage, intracranial mass, CVA, TIA, subarachnoid hemorrhage, subdural hematoma, electrolyte abnormality, hyponatremia, persistent hypoglycemia or other emergent etiology this time. FINAL IMPRESSION      1. Seizure ([address]    2.  Hypokalemia          DISPOSITION/PLAN   DISPOSITION Decision To Discharge 12/03/2022 07:36:58 PM      PATIENT REFERRED TO:  Crystal Joypradeep  [phone]  Schedule an appointment as soon as possible for a visit         DISCHARGE MEDICATIONS:  Discharge Medication List as of 12/3/2022  7:41 PM          DISCONTINUED MEDICATIONS:  Discharge Medication List as of 12/3/2022  7:41 PM                 (Please note that portions of this note were completed with a voice recognition program.  Efforts were made to edit the dictations but occasionally words are mis-transcribed.)    TANG Mitchell (electronically signed)          TANG Gonzalez  12/03/22 [id number]

## 2023-03-15 ENCOUNTER — OFFICE VISIT (OUTPATIENT)
Dept: PRIMARY CARE CLINIC | Age: 53
End: 2023-03-15

## 2023-03-15 VITALS
RESPIRATION RATE: 14 BRPM | DIASTOLIC BLOOD PRESSURE: 78 MMHG | HEIGHT: 62 IN | BODY MASS INDEX: 25.21 KG/M2 | WEIGHT: 137 LBS | HEART RATE: 79 BPM | SYSTOLIC BLOOD PRESSURE: 110 MMHG | OXYGEN SATURATION: 99 %

## 2023-03-15 DIAGNOSIS — E78.2 MIXED HYPERLIPIDEMIA: ICD-10-CM

## 2023-03-15 DIAGNOSIS — Z23 NEED FOR SHINGLES VACCINE: ICD-10-CM

## 2023-03-15 DIAGNOSIS — Z12.11 COLON CANCER SCREENING: ICD-10-CM

## 2023-03-15 DIAGNOSIS — H91.90 HEARING LOSS, UNSPECIFIED HEARING LOSS TYPE, UNSPECIFIED LATERALITY: ICD-10-CM

## 2023-03-15 DIAGNOSIS — G40.909 SEIZURE DISORDER (HCC): Primary | ICD-10-CM

## 2023-03-15 RX ORDER — ATORVASTATIN CALCIUM 40 MG/1
40 TABLET, FILM COATED ORAL NIGHTLY
Qty: 30 TABLET | Refills: 5 | Status: SHIPPED | OUTPATIENT
Start: 2023-03-15

## 2023-03-15 SDOH — ECONOMIC STABILITY: INCOME INSECURITY: HOW HARD IS IT FOR YOU TO PAY FOR THE VERY BASICS LIKE FOOD, HOUSING, MEDICAL CARE, AND HEATING?: NOT HARD AT ALL

## 2023-03-15 SDOH — ECONOMIC STABILITY: FOOD INSECURITY: WITHIN THE PAST 12 MONTHS, THE FOOD YOU BOUGHT JUST DIDN'T LAST AND YOU DIDN'T HAVE MONEY TO GET MORE.: NEVER TRUE

## 2023-03-15 SDOH — ECONOMIC STABILITY: FOOD INSECURITY: WITHIN THE PAST 12 MONTHS, YOU WORRIED THAT YOUR FOOD WOULD RUN OUT BEFORE YOU GOT MONEY TO BUY MORE.: NEVER TRUE

## 2023-03-15 SDOH — ECONOMIC STABILITY: HOUSING INSECURITY
IN THE LAST 12 MONTHS, WAS THERE A TIME WHEN YOU DID NOT HAVE A STEADY PLACE TO SLEEP OR SLEPT IN A SHELTER (INCLUDING NOW)?: NO

## 2023-03-15 ASSESSMENT — ENCOUNTER SYMPTOMS
CHEST TIGHTNESS: 0
COUGH: 0
SINUS PRESSURE: 0
ABDOMINAL DISTENTION: 0
BACK PAIN: 0
NAUSEA: 0
VOMITING: 0
CONSTIPATION: 0
ABDOMINAL PAIN: 0
SHORTNESS OF BREATH: 0
DIARRHEA: 0
EYE PAIN: 0
BLOOD IN STOOL: 0
EYE REDNESS: 0
WHEEZING: 0
SORE THROAT: 0
COLOR CHANGE: 0
TROUBLE SWALLOWING: 0

## 2023-03-15 ASSESSMENT — PATIENT HEALTH QUESTIONNAIRE - PHQ9
SUM OF ALL RESPONSES TO PHQ QUESTIONS 1-9: 0
SUM OF ALL RESPONSES TO PHQ QUESTIONS 1-9: 0
2. FEELING DOWN, DEPRESSED OR HOPELESS: 0
SUM OF ALL RESPONSES TO PHQ QUESTIONS 1-9: 0
1. LITTLE INTEREST OR PLEASURE IN DOING THINGS: 0
SUM OF ALL RESPONSES TO PHQ QUESTIONS 1-9: 0
SUM OF ALL RESPONSES TO PHQ9 QUESTIONS 1 & 2: 0

## 2023-03-15 NOTE — ASSESSMENT & PLAN NOTE
patient wears bilateral hearing aids is at baseline. Patient follows with ENT every 6 months  Follow-up with specialists as per their orders.

## 2023-03-15 NOTE — PROGRESS NOTES
Lexie Medina (:  1970) is a 48 y.o. male,New patient, here for evaluation of the following chief complaint(s):  New Patient      SUBJECTIVE/OBJECTIVE:  HPI    This is a 48 y.o. male patient who comes in for establishment of care. The patient has a past medical history of -    1. Seizure disorder-patient had an isolated seizure episode 12/3/2022 was seen in the emergency room. Patient had CT scan which was within normal limits. Patient has not had any other seizure episode. 2.  Hearing loss -patient wears bilateral hearing aids is at baseline. 3. HlD - Patient denies complaints or symptoms today. Patient mentions he is adherent with treatment.      The ASCVD Risk score (Gertrudis MURPHY, et al., 2019) failed to calculate for the following reasons:    Cannot find a previous HDL lab    Cannot find a previous total cholesterol lab     Health Maintenance Due   Topic Date Due    Pneumococcal 0-64 years Vaccine (1 - PCV) Never done    Lipids  Never done    Depression Screen  Never done    HIV screen  Never done    Hepatitis C screen  Never done    Diabetes screen  Never done    Colorectal Cancer Screen  Never done    COVID-19 Vaccine (3 - Booster for Moderna series) 2021    Shingles vaccine (1 of 2) Never done    Flu vaccine (1) 2022      Social History     Socioeconomic History    Marital status: Single     Spouse name: Not on file    Number of children: Not on file    Years of education: Not on file    Highest education level: Not on file   Occupational History    Not on file   Tobacco Use    Smoking status: Some Days    Smokeless tobacco: Never   Vaping Use    Vaping Use: Never used   Substance and Sexual Activity    Alcohol use: Never    Drug use: Never    Sexual activity: Not on file   Other Topics Concern    Not on file   Social History Narrative    Not on file     Social Determinants of Health     Financial Resource Strain: Low Risk     Difficulty of Paying Living Expenses: Not hard at all Food Insecurity: No Food Insecurity    Worried About Running Out of Food in the Last Year: Never true    Ran Out of Food in the Last Year: Never true   Transportation Needs: Unknown    Lack of Transportation (Medical): Not on file    Lack of Transportation (Non-Medical): No   Physical Activity: Not on file   Stress: Not on file   Social Connections: Not on file   Intimate Partner Violence: Not on file   Housing Stability: Unknown    Unable to Pay for Housing in the Last Year: Not on file    Number of Places Lived in the Last Year: Not on file    Unstable Housing in the Last Year: No      Past Medical History:   Diagnosis Date    Hyperlipidemia     Laceration of left knee 4/5/2021    Open knee wound, left, initial encounter 4/5/2021      No past surgical history on file. Current Outpatient Medications   Medication Sig Dispense Refill    vitamin D3 (CHOLECALCIFEROL) 125 MCG (5000 UT) TABS tablet 1 tablet      atorvastatin (LIPITOR) 40 MG tablet Take 1 tablet by mouth at bedtime 30 tablet 5    ibuprofen (ADVIL;MOTRIN) 800 MG tablet Take 1 tablet by mouth every 8 hours as needed for Pain 30 tablet 0     No current facility-administered medications for this visit. No Known Allergies     Review of Systems   Constitutional:  Negative for activity change, appetite change, chills, fatigue, fever and unexpected weight change. HENT:  Negative for congestion, ear pain, postnasal drip, sinus pressure, sore throat, tinnitus and trouble swallowing. Eyes:  Negative for pain and redness. Respiratory:  Negative for cough, chest tightness, shortness of breath and wheezing. Cardiovascular:  Negative for chest pain, palpitations and leg swelling. Gastrointestinal:  Negative for abdominal distention, abdominal pain, blood in stool, constipation, diarrhea, nausea and vomiting. Endocrine: Negative for cold intolerance, heat intolerance and polydipsia.    Genitourinary:  Negative for decreased urine volume, dysuria, flank pain, frequency, hematuria and urgency. Musculoskeletal:  Negative for arthralgias, back pain, joint swelling, neck pain and neck stiffness. Skin:  Negative for color change and rash. Neurological:  Positive for seizures. Negative for dizziness, weakness, numbness and headaches. Hematological:  Negative for adenopathy. Psychiatric/Behavioral:  Negative for behavioral problems, sleep disturbance and suicidal ideas. The patient is not nervous/anxious. /78 (Site: Left Upper Arm, Position: Sitting, Cuff Size: Large Adult)   Pulse 79   Resp 14   Ht 5' 1.5\" (1.562 m)   Wt 137 lb (62.1 kg)   SpO2 99%   BMI 25.47 kg/m²    Physical Exam  Constitutional:       General: He is not in acute distress. Appearance: Normal appearance. He is not ill-appearing. HENT:      Head: Normocephalic and atraumatic. Right Ear: Tympanic membrane, ear canal and external ear normal. There is no impacted cerumen. Left Ear: Tympanic membrane, ear canal and external ear normal. There is no impacted cerumen. Mouth/Throat:      Mouth: Mucous membranes are moist.      Pharynx: No oropharyngeal exudate or posterior oropharyngeal erythema. Eyes:      Extraocular Movements: Extraocular movements intact. Conjunctiva/sclera: Conjunctivae normal.      Pupils: Pupils are equal, round, and reactive to light. Neck:      Vascular: No carotid bruit. Cardiovascular:      Rate and Rhythm: Normal rate and regular rhythm. Pulses: Normal pulses. Heart sounds: Normal heart sounds. No murmur heard. No gallop. Pulmonary:      Effort: Pulmonary effort is normal.      Breath sounds: Normal breath sounds. No wheezing, rhonchi or rales. Abdominal:      General: Abdomen is flat. Bowel sounds are normal. There is no distension. Palpations: Abdomen is soft. Tenderness: There is no abdominal tenderness. There is no guarding or rebound.    Musculoskeletal:         General: No swelling or tenderness. Normal range of motion. Cervical back: No tenderness. Lymphadenopathy:      Cervical: No cervical adenopathy. Skin:     Findings: No erythema, lesion or rash. Neurological:      General: No focal deficit present. Mental Status: He is alert and oriented to person, place, and time. Mental status is at baseline. Cranial Nerves: No cranial nerve deficit. Motor: No weakness. Psychiatric:         Mood and Affect: Mood normal.         Behavior: Behavior normal.         Thought Content: Thought content normal.         Judgment: Judgment normal.       ASSESSMENT/PLAN:  1. Seizure disorder [facility])  Assessment & Plan:   Patient had an isolated seizure episode 12/3/2022 was seen in the emergency room. Patient had CT scan which was within normal limits. Patient never had the MRI. Patient has not had any other seizure episode. 2. Hearing loss, unspecified hearing loss type, unspecified laterality  Assessment & Plan:   patient wears bilateral hearing aids is at baseline. Patient follows with ENT every 6 months  Follow-up with specialists as per their orders. 3. Mixed hyperlipidemia  Assessment & Plan:   Stable on atorvastatin 40 mg nightly  Orders:  -     atorvastatin (LIPITOR) 40 MG tablet; Take 1 tablet by mouth at bedtime, Disp-30 tablet, R-5Normal  4. Colon cancer screening  -     Wilbert Renteria MD, Gastroenterology, [facility]  5. Need for shingles vaccine  -     Zoster, SHINGRIX, (50 yrs +), IM    Given referral and will make appointment to specialist.      Preventative care discussed. Encouraged healthy diet choices, reg exercise. Patient agreed w/plan and verbal understanding. Patient advised to call or return with any concerns or problems or worsening conditions. Go to the ER for any severe or potentially life threatening problems. Return for 30 minutes annual physical.     This note was generated in part or in whole with voice recognition software.   Voice recognition is usually quite accurate but there are transcription errors that can often occur. All attempts were made to correct these errors I apologized for any  typographical errors that were not detected and corrected. Electronically signed by Valente Carter.  Ximena Petty MD on 3/15/2023 at 1:58 PM.

## 2023-03-15 NOTE — ASSESSMENT & PLAN NOTE
Patient had an isolated seizure episode 12/3/2022 was seen in the emergency room. Patient had CT scan which was within normal limits. Patient never had the MRI. Patient has not had any other seizure episode.

## 2023-04-05 ENCOUNTER — TELEPHONE (OUTPATIENT)
Dept: PRIMARY CARE CLINIC | Age: 53
End: 2023-04-05

## 2023-04-05 NOTE — TELEPHONE ENCOUNTER
Fiorella Lang came in to let Dr Michelle Choe know his pharmacy is [facility]. It looks like it is already in the system.

## 2023-04-17 ENCOUNTER — OFFICE VISIT (OUTPATIENT)
Dept: PRIMARY CARE CLINIC | Age: 53
End: 2023-04-17
Payer: COMMERCIAL

## 2023-04-17 VITALS
OXYGEN SATURATION: 99 % | SYSTOLIC BLOOD PRESSURE: 98 MMHG | WEIGHT: 133 LBS | RESPIRATION RATE: 14 BRPM | BODY MASS INDEX: 24.48 KG/M2 | DIASTOLIC BLOOD PRESSURE: 74 MMHG | HEIGHT: 62 IN | HEART RATE: 89 BPM

## 2023-04-17 DIAGNOSIS — G40.909 SEIZURE DISORDER (HCC): ICD-10-CM

## 2023-04-17 DIAGNOSIS — E55.9 VITAMIN D DEFICIENCY: ICD-10-CM

## 2023-04-17 DIAGNOSIS — Z11.4 ENCOUNTER FOR SCREENING FOR HIV: ICD-10-CM

## 2023-04-17 DIAGNOSIS — Z11.59 ENCOUNTER FOR HEPATITIS C SCREENING TEST FOR LOW RISK PATIENT: ICD-10-CM

## 2023-04-17 DIAGNOSIS — Z00.00 ENCOUNTER FOR WELL ADULT EXAM WITHOUT ABNORMAL FINDINGS: Primary | ICD-10-CM

## 2023-04-17 DIAGNOSIS — R73.09 IMPAIRED GLUCOSE REGULATION: ICD-10-CM

## 2023-04-17 DIAGNOSIS — N39.3 STRESS INCONTINENCE: ICD-10-CM

## 2023-04-17 DIAGNOSIS — Z78.9 LANGUAGE BARRIER: ICD-10-CM

## 2023-04-17 DIAGNOSIS — R53.83 OTHER FATIGUE: ICD-10-CM

## 2023-04-17 DIAGNOSIS — E78.2 MIXED HYPERLIPIDEMIA: ICD-10-CM

## 2023-04-17 DIAGNOSIS — F17.210 CIGARETTE SMOKER: ICD-10-CM

## 2023-04-17 LAB
25(OH)D3 SERPL-MCNC: 48.2 NG/ML
BASOPHILS # BLD: 0.1 K/UL (ref 0–0.2)
BASOPHILS NFR BLD: 1 %
DEPRECATED RDW RBC AUTO: 12.8 % (ref 12.4–15.4)
EOSINOPHIL # BLD: 0.4 K/UL (ref 0–0.6)
EOSINOPHIL NFR BLD: 6.3 %
HCT VFR BLD AUTO: 44.9 % (ref 40.5–52.5)
HGB BLD-MCNC: 15.3 G/DL (ref 13.5–17.5)
LYMPHOCYTES # BLD: 1.9 K/UL (ref 1–5.1)
LYMPHOCYTES NFR BLD: 32.7 %
MCH RBC QN AUTO: 31.4 PG (ref 26–34)
MCHC RBC AUTO-ENTMCNC: 33.9 G/DL (ref 31–36)
MCV RBC AUTO: 92.5 FL (ref 80–100)
MONOCYTES # BLD: 0.4 K/UL (ref 0–1.3)
MONOCYTES NFR BLD: 6.3 %
NEUTROPHILS # BLD: 3.1 K/UL (ref 1.7–7.7)
NEUTROPHILS NFR BLD: 53.7 %
PLATELET # BLD AUTO: 213 K/UL (ref 135–450)
PMV BLD AUTO: 8.3 FL (ref 5–10.5)
RBC # BLD AUTO: 4.86 M/UL (ref 4.2–5.9)
TSH SERPL DL<=0.005 MIU/L-ACNC: 2.01 UIU/ML (ref 0.27–4.2)
VIT B12 SERPL-MCNC: 520 PG/ML (ref 211–911)
WBC # BLD AUTO: 5.7 K/UL (ref 4–11)

## 2023-04-17 PROCEDURE — 99396 PREV VISIT EST AGE 40-64: CPT | Performed by: INTERNAL MEDICINE

## 2023-04-17 RX ORDER — LEVETIRACETAM 15 MG/ML
1 INJECTION IONTOPHORESIS 3 TIMES DAILY
Qty: 90 EACH | Refills: 5 | Status: SHIPPED | OUTPATIENT
Start: 2023-04-17

## 2023-04-17 ASSESSMENT — ENCOUNTER SYMPTOMS
SINUS PRESSURE: 0
BLOOD IN STOOL: 0
SHORTNESS OF BREATH: 0
NAUSEA: 0
SORE THROAT: 0
COLOR CHANGE: 0
WHEEZING: 0
EYE REDNESS: 0
TROUBLE SWALLOWING: 0
VOMITING: 0
DIARRHEA: 0
EYE PAIN: 0
COUGH: 0
CONSTIPATION: 0
ABDOMINAL PAIN: 0
BACK PAIN: 0
ABDOMINAL DISTENTION: 0
CHEST TIGHTNESS: 0

## 2023-04-17 NOTE — PROGRESS NOTES
stool, constipation, diarrhea, nausea and vomiting. Endocrine: Negative for cold intolerance, heat intolerance and polydipsia. Genitourinary:  Negative for decreased urine volume, dysuria, flank pain, frequency, hematuria and urgency. Musculoskeletal:  Negative for arthralgias, back pain, joint swelling, neck pain and neck stiffness. Skin:  Negative for color change and rash. Neurological:  Positive for seizures. Negative for dizziness, weakness, numbness and headaches. Hematological:  Negative for adenopathy. Psychiatric/Behavioral:  Negative for behavioral problems, sleep disturbance and suicidal ideas. The patient is not nervous/anxious. No Known Allergies      Prior to Visit Medications    Medication Sig Taking? Authorizing Provider   Incontinence Supply Disposable (DEPEND FITTED BRIEFS SM/MED) MISC 1 each by Does not apply route in the morning, at noon, and at bedtime Yes Xander Bustillo MD   vitamin D3 (CHOLECALCIFEROL) 125 MCG (5000 UT) TABS tablet 1 tablet Yes Historical Provider, MD   atorvastatin (LIPITOR) 40 MG tablet Take 1 tablet by mouth at bedtime Yes Xander Bustillo MD   ibuprofen (ADVIL;MOTRIN) 800 MG tablet Take 1 tablet by mouth every 8 hours as needed for Pain  Denae Norton PA-C         Past Medical History:   Diagnosis Date    Hyperlipidemia     Laceration of left knee 4/5/2021    Open knee wound, left, initial encounter 4/5/2021       No past surgical history on file. No family history on file.     Social History     Tobacco Use    Smoking status: Some Days    Smokeless tobacco: Never   Vaping Use    Vaping Use: Never used   Substance Use Topics    Alcohol use: Never    Drug use: Never       Objective   BP 98/74 (Site: Left Upper Arm, Position: Sitting, Cuff Size: Medium Adult)   Pulse 89   Resp 14   Ht 5' 1.5\" (1.562 m)   Wt 133 lb (60.3 kg)   SpO2 99%   BMI 24.72 kg/m²   Wt Readings from Last 3 Encounters:   04/17/23 133 lb (60.3 kg)   03/15/23 137 lb (62.1

## 2023-04-17 NOTE — PATIENT INSTRUCTIONS
High-fat baked goods (eg, muffins, donuts, pastries) Quick breads, self-rising flour, and biscuit mixes Regular bread crumbs Instant hot cereals Commercially prepared rice, pasta, or stuffing mixes   Vegetables   Most fresh, frozen, and low-sodium canned vegetables Low-sodium and salt-free vegetable juices Canned vegetables if unsalted or rinsed   Regular canned vegetables and juices, including sauerkraut and pickled vegetables Frozen vegetables with sauces Commercially prepared potato and vegetable mixes   Fruits   Most fresh, frozen, and canned fruits All fruit juices   Fruits processed with salt or sodium   Milk   Nonfat or low-fat (1%) milk Nonfat or low-fat yogurt Cottage cheese, low-fat ricotta, cheeses labeled as low-fat and low-sodium   Whole milk Reduced-fat (2%) milk Malted and chocolate milk Full fat yogurt Most cheeses (unless low-fat and low salt) Buttermilk (no more than 1 cup per week)   Meats and Beans   Lean cuts of fresh or frozen beef, veal, lamb, or pork (look for the word loin) Fresh or frozen poultry without the skin Fresh or frozen fish and some shellfish Egg whites and egg substitutes (Limit whole eggs to three per week) Tofu Nuts or seeds (unsalted, dry-roasted), low-sodium peanut butter Dried peas, beans, and lentils   Any smoked, cured, salted, or canned meat, fish, or poultry (including benson, chipped beef, cold cuts, hot dogs, sausages, sardines, and anchovies) Poultry skins Breaded and/or fried fish or meats Canned peas, beans, and lentils Salted nuts   Fats and Oils   Olive oil and canola oil Low-sodium, low-fat salad dressings and mayonnaise   Butter, margarine, coconut and palm oils, benson fat   Snacks, Sweets, and Condiments   Low-sodium or unsalted versions of broths, soups, soy sauce, and condiments Pepper, herbs, and spices; vinegar, lemon, or lime juice Low-fat frozen desserts (yogurt, sherbet, fruit bars) Sugar, cocoa powder, honey, syrup, jam, and preserves Low-fat, trans-fat

## 2023-04-17 NOTE — ASSESSMENT & PLAN NOTE
Patient counseled on the benefits of smoking cessation including but not limited to reducing the incidence of lung cancer, COPD, sheridan-pharyngeal cancer, laryngeal cancer, Pancreatic cancer, kidney/bladder cancer, osteoporosis, Heart disease, CVA, PAD.

## 2023-04-18 DIAGNOSIS — Z91.89 AT RISK FOR HEART DISEASE: ICD-10-CM

## 2023-04-18 DIAGNOSIS — E78.2 MIXED HYPERLIPIDEMIA: ICD-10-CM

## 2023-04-18 DIAGNOSIS — F17.210 CIGARETTE SMOKER: Primary | ICD-10-CM

## 2023-04-18 LAB
ALBUMIN SERPL-MCNC: 4.3 G/DL (ref 3.4–5)
ALBUMIN/GLOB SERPL: 1.7 {RATIO} (ref 1.1–2.2)
ALP SERPL-CCNC: 101 U/L (ref 40–129)
ALT SERPL-CCNC: 21 U/L (ref 10–40)
ANION GAP SERPL CALCULATED.3IONS-SCNC: 16 MMOL/L (ref 3–16)
AST SERPL-CCNC: 25 U/L (ref 15–37)
BILIRUB SERPL-MCNC: 0.7 MG/DL (ref 0–1)
BUN SERPL-MCNC: 22 MG/DL (ref 7–20)
CALCIUM SERPL-MCNC: 9.4 MG/DL (ref 8.3–10.6)
CHLORIDE SERPL-SCNC: 103 MMOL/L (ref 99–110)
CHOLEST SERPL-MCNC: 214 MG/DL (ref 0–199)
CO2 SERPL-SCNC: 24 MMOL/L (ref 21–32)
CREAT SERPL-MCNC: 1.3 MG/DL (ref 0.9–1.3)
EST. AVERAGE GLUCOSE BLD GHB EST-MCNC: 116.9 MG/DL
GFR SERPLBLD CREATININE-BSD FMLA CKD-EPI: >60 ML/MIN/{1.73_M2}
GLUCOSE SERPL-MCNC: 106 MG/DL (ref 70–99)
HBA1C MFR BLD: 5.7 %
HDLC SERPL-MCNC: 38 MG/DL (ref 40–60)
HIV 1+2 AB+HIV1 P24 AG SERPL QL IA: NORMAL
HIV 2 AB SERPL QL IA: NORMAL
HIV1 AB SERPL QL IA: NORMAL
HIV1 P24 AG SERPL QL IA: NORMAL
LDLC SERPL CALC-MCNC: 152 MG/DL
MAGNESIUM SERPL-MCNC: 2.1 MG/DL (ref 1.8–2.4)
POTASSIUM SERPL-SCNC: 4.2 MMOL/L (ref 3.5–5.1)
PROT SERPL-MCNC: 6.8 G/DL (ref 6.4–8.2)
SODIUM SERPL-SCNC: 143 MMOL/L (ref 136–145)
TRIGL SERPL-MCNC: 120 MG/DL (ref 0–150)
VLDLC SERPL CALC-MCNC: 24 MG/DL

## 2023-04-18 NOTE — PROGRESS NOTES
The 10-year ASCVD risk score (Gertrudis MURPHY, et al., 2019) is: 7.5%    Values used to calculate the score:      Age: 48 years      Sex: Male      Is Non- : No      Diabetic: No      Tobacco smoker: Yes      Systolic Blood Pressure: 98 mmHg      Is BP treated: No      HDL Cholesterol: 38 mg/dL      Total Cholesterol: 214 mg/dL

## 2023-04-18 NOTE — RESULT ENCOUNTER NOTE
Please let patient know that their LDL/Badcholesterol is 152. Please asked patient whether he is taking his atorvastatin 40 mg every night. His LDL should be under 100 to lower the incidence/risk of CVD which includes heart attack and stroke.  Additional treatment recommendations low fat diet-less cheese/butter, fried food, fast food, increase intake of green/salads/fiber/oatmeal for breakfast, an exercise. his 10 year cardiovascular disease risk score  (Which is the % risk of having a cardiac event or stroke in the next 10 years) is  7.5  %.    I will order Coronary (CT Heart) Calcium Score  This is a dedicated CAT scan of the coronary arteries which assess calcium in the artery walls which is used to assess risk of developing coronary artery disease. Patient should call their health insurance to see if this scan is covered.

## 2023-04-19 LAB
HCV AB S/CO SERPL IA: <0.02 IV
HCV AB SERPL QL IA: NEGATIVE

## 2023-05-17 PROBLEM — Z00.00 ENCOUNTER FOR WELL ADULT EXAM WITHOUT ABNORMAL FINDINGS: Status: RESOLVED | Noted: 2023-04-17 | Resolved: 2023-05-17

## 2023-08-16 NOTE — PROGRESS NOTES
Patient __X_ reached-spoke with dtr-in-law-Rubi who speaks Abhijeeti. DATE___8/22/23_____ TIME___1250_____ARRIVAL_1120 FEC ________      Nothing to eat or drink after midnight the night before,except for what the prep instructions call for. If you do not have the instructions or do not understand them please contact your doctors office. Follow any instructions your doctors office has given you including what medications to take the AM of your procedure and which ones to hold. You may use your inhalers - bring rescue inhalers with you DOS. If you take a long acting insulin the anne-marie prior please cut the dose in half and take no diabetic medications that AM.Follow specific doctors office instructions regarding blood thinners and if they want you to hold and for how long. If you are on a blood thinner and have no instructions please contact the office and ask. Dress comfortably,bring your insurance card,picture ID,and a complete list of medications, including supplements. You must have a responsible adult to stay with you during the procedure,drive you home and stay with you. [facility] phone number [phone] for any questions. OTHER INSTRUCTIONS(if applicable)___none______________________________________________________      [address] [facility] __Accuracy Now____    Language __Nepali____    Date __8/22/23_______    Arrival Time __1120____    Length of time _3 hours_____    Place ___FEC__    Confirmation Number __[id number]____     VISITOR POLICY(subject to change)    Current visitor policy is 2 visitors per patient. No children allowed. Mask at discretion of facility. Visiting hours are 8a-8p. Overnight visitors will be at the discretion of the nurse. All policies are subject to change.

## 2023-08-21 ENCOUNTER — ANESTHESIA (OUTPATIENT)
Dept: ENDOSCOPY | Age: 53
End: 2023-08-21
Payer: COMMERCIAL

## 2023-08-21 ENCOUNTER — ANESTHESIA EVENT (OUTPATIENT)
Dept: ENDOSCOPY | Age: 53
End: 2023-08-21
Payer: COMMERCIAL

## 2023-08-21 ENCOUNTER — HOSPITAL ENCOUNTER (OUTPATIENT)
Age: 53
Setting detail: OUTPATIENT SURGERY
Discharge: HOME | End: 2023-08-21
Attending: INTERNAL MEDICINE | Admitting: INTERNAL MEDICINE
Payer: COMMERCIAL

## 2023-08-21 VITALS
OXYGEN SATURATION: 99 % | BODY MASS INDEX: 24.17 KG/M2 | SYSTOLIC BLOOD PRESSURE: 115 MMHG | WEIGHT: 128 LBS | DIASTOLIC BLOOD PRESSURE: 85 MMHG | RESPIRATION RATE: 14 BRPM | TEMPERATURE: 97 F | HEART RATE: 75 BPM | HEIGHT: 61 IN

## 2023-08-21 PROCEDURE — 3609008400 HC SIGMOIDOSCOPY DIAGNOSTIC: Performed by: INTERNAL MEDICINE

## 2023-08-21 PROCEDURE — 2500000003 HC RX 250 WO HCPCS: Performed by: NURSE ANESTHETIST, CERTIFIED REGISTERED

## 2023-08-21 PROCEDURE — 2580000003 HC RX 258: Performed by: ANESTHESIOLOGY

## 2023-08-21 PROCEDURE — 7100000011 HC PHASE II RECOVERY - ADDTL 15 MIN: Performed by: INTERNAL MEDICINE

## 2023-08-21 PROCEDURE — 2709999900 HC NON-CHARGEABLE SUPPLY: Performed by: INTERNAL MEDICINE

## 2023-08-21 PROCEDURE — 6360000002 HC RX W HCPCS: Performed by: NURSE ANESTHETIST, CERTIFIED REGISTERED

## 2023-08-21 PROCEDURE — 7100000010 HC PHASE II RECOVERY - FIRST 15 MIN: Performed by: INTERNAL MEDICINE

## 2023-08-21 PROCEDURE — 3700000000 HC ANESTHESIA ATTENDED CARE: Performed by: INTERNAL MEDICINE

## 2023-08-21 RX ORDER — SODIUM CHLORIDE 9 MG/ML
INJECTION, SOLUTION INTRAVENOUS CONTINUOUS
Status: DISCONTINUED | OUTPATIENT
Start: 2023-08-21 | End: 2023-08-21 | Stop reason: HOSPADM

## 2023-08-21 RX ORDER — PROPOFOL 10 MG/ML
INJECTION, EMULSION INTRAVENOUS PRN
Status: DISCONTINUED | OUTPATIENT
Start: 2023-08-21 | End: 2023-08-21 | Stop reason: SDUPTHER

## 2023-08-21 RX ORDER — LIDOCAINE HYDROCHLORIDE 20 MG/ML
INJECTION, SOLUTION INFILTRATION; PERINEURAL PRN
Status: DISCONTINUED | OUTPATIENT
Start: 2023-08-21 | End: 2023-08-21 | Stop reason: SDUPTHER

## 2023-08-21 RX ADMIN — PROPOFOL 100 MG: 10 INJECTION, EMULSION INTRAVENOUS at 14:16

## 2023-08-21 RX ADMIN — SODIUM CHLORIDE: 9 INJECTION, SOLUTION INTRAVENOUS at 13:48

## 2023-08-21 RX ADMIN — LIDOCAINE HYDROCHLORIDE 100 MG: 20 INJECTION, SOLUTION INFILTRATION; PERINEURAL at 14:15

## 2023-08-21 ASSESSMENT — PAIN SCALES - GENERAL: PAINLEVEL_OUTOF10: 0

## 2023-08-21 ASSESSMENT — PAIN - FUNCTIONAL ASSESSMENT: PAIN_FUNCTIONAL_ASSESSMENT: NONE - DENIES PAIN

## 2023-08-21 NOTE — PROGRESS NOTES
To endo recovery from procedure room. VSS, does nor awaken to voice, respirations easy and unlabored. Sister-in-law at bedside, speaks english, though utilized Video assist-[employer] remote video for confirmation.

## 2023-08-21 NOTE — PROGRESS NOTES
Reviewed pt problem list, history, H&P and assessment preoperatively. Scope verified using 2 person system. Family in waiting room.     Electronically signed by Preet Gore RN on 8/21/2023 at 2:16 PM

## 2023-08-21 NOTE — DISCHARGE INSTRUCTIONS
Today's prep was poor. Pt to return tomorrow at 11:20 am for a repeat colonoscopy per Dr Fransico Gitelman. Prep to be ordered today per Dr. Samia Wagner and picked up at [facility]  Reviewed Clear liquid diet, no red, no purple, no blue liquids for today and NPO status. COLONSCOPY DISCHARGE INSTRUCTIONS    You may experience some lightheadedness for the next several hours. Plan on quiet relaxation for the rest of today. Nap for four hours following procedure if possible. A responsible adult needs to stay with you today. Eat bland food and avoid anything greasy or spicy initially-progress to your normal diet gradually. Diet restrictions as instructed. You may resume home medications as instructed. It is not unusual to experience some mild cramping or gas pains, and you may not have a bowel movement for several days. If you had a polyp removed, avoid strenuous activity for 48 hours. Avoid the use of aspirin or related compounds for one week, unless otherwise instructed by your physician. You may notice a small amount of blood in your next few bowel movements, but if a large amount passes, call your physician. If you have any of the following problems, notify your physician or return to the hospital emergency room : fever, chills, excessive bleeding, excessive vomiting, difficulty swallowing, uncontrolled pain, increased abdominal distention, shortness of breath or any other problems. Call your doctor at [phone] if you have any concerns. If you had any biopsies or polyps call for results in 5-7 business days. See your physician's report for details about your procedure and recommendations. ANESTHESIA DISCHARGE INSTRUCTIONS    Wear your seatbelt home. You are under the influence of drugs-do not drink alcohol, drive ,operate machinery,or make any important decisions or sign any legal documentsfor 24 hours. You may resume normal activities tomorrow.   A responsible adult

## 2023-08-21 NOTE — ANESTHESIA POSTPROCEDURE EVALUATION
Department of Anesthesiology  Postprocedure Note    Patient: Minna Marques  MRN: [id number]  YOB: 1970  Date of evaluation: 8/21/2023      Procedure Summary     Date: 08/21/23 Room / Location: [address] [place] [address]    Anesthesia Start: 6713 Anesthesia Stop: 5534    Procedure: SIGMOIDOSCOPY DIAGNOSTIC FLEXIBLE Diagnosis:       Colon cancer screening      (Colon cancer screening [Z12.11])    Surgeons: Kevon Simpson MD Responsible Provider: Kaley García MD    Anesthesia Type: MAC ASA Status: 2          Anesthesia Type: No value filed. Eileen Phase I: Eileen Score: 10    Eileen Phase II:        Anesthesia Post Evaluation    Patient location during evaluation: PACU  Patient participation: complete - patient participated  Level of consciousness: awake  Airway patency: patent  Nausea & Vomiting: no vomiting  Complications: no  Cardiovascular status: hemodynamically stable  Respiratory status: acceptable  Hydration status: euvolemic  There was medical reason for not using a multimodal analgesia pain management approach. Pain management: adequate

## 2023-08-21 NOTE — PROGRESS NOTES
Spoke with [address] at Dr Martinez Breath office that pt's prep needs to be reorder and picked up today per patient at [facility] DR MARY BAEZ on [address], for repeat colonoscopy tomorrow. Pt is ready for discharge and reviewed with pt and his sister-in-law. Clear liquid status followed by prep intake then NPO status for tomorrow's procedure.

## 2023-08-21 NOTE — PROGRESS NOTES
Pt awakens to verbal stimuli. Denies pain. Reviewed discharge instructions that patient needs to return tomorrow with an arrival time 1120 am to repeat colonoscopy related to today's incomplete procedure due to poor prep. Pt's sister-in-law expressed that pt ate solid foods yesterday. Explained both to pt and pt family member the importance that pt needs to remain on a clear liquid diet followed by NPO status. Confirmed utilizing video remote interpretation.

## 2023-08-22 ENCOUNTER — ANESTHESIA EVENT (OUTPATIENT)
Dept: ENDOSCOPY | Age: 53
End: 2023-08-22
Payer: COMMERCIAL

## 2023-08-22 ENCOUNTER — HOSPITAL ENCOUNTER (OUTPATIENT)
Age: 53
Setting detail: OUTPATIENT SURGERY
Discharge: HOME | End: 2023-08-22
Attending: INTERNAL MEDICINE | Admitting: INTERNAL MEDICINE
Payer: COMMERCIAL

## 2023-08-22 ENCOUNTER — ANESTHESIA (OUTPATIENT)
Dept: ENDOSCOPY | Age: 53
End: 2023-08-22
Payer: COMMERCIAL

## 2023-08-22 VITALS
DIASTOLIC BLOOD PRESSURE: 89 MMHG | HEIGHT: 61 IN | BODY MASS INDEX: 24.17 KG/M2 | SYSTOLIC BLOOD PRESSURE: 125 MMHG | HEART RATE: 80 BPM | RESPIRATION RATE: 16 BRPM | OXYGEN SATURATION: 100 % | TEMPERATURE: 97 F | WEIGHT: 128 LBS

## 2023-08-22 DIAGNOSIS — Z12.11 COLON CANCER SCREENING: ICD-10-CM

## 2023-08-22 PROCEDURE — 7100000011 HC PHASE II RECOVERY - ADDTL 15 MIN: Performed by: INTERNAL MEDICINE

## 2023-08-22 PROCEDURE — 2580000003 HC RX 258: Performed by: NURSE ANESTHETIST, CERTIFIED REGISTERED

## 2023-08-22 PROCEDURE — 6360000002 HC RX W HCPCS: Performed by: NURSE ANESTHETIST, CERTIFIED REGISTERED

## 2023-08-22 PROCEDURE — 3700000001 HC ADD 15 MINUTES (ANESTHESIA): Performed by: INTERNAL MEDICINE

## 2023-08-22 PROCEDURE — 7100000010 HC PHASE II RECOVERY - FIRST 15 MIN: Performed by: INTERNAL MEDICINE

## 2023-08-22 PROCEDURE — 3609010600 HC COLONOSCOPY POLYPECTOMY SNARE/COLD BIOPSY: Performed by: INTERNAL MEDICINE

## 2023-08-22 PROCEDURE — 3700000000 HC ANESTHESIA ATTENDED CARE: Performed by: INTERNAL MEDICINE

## 2023-08-22 PROCEDURE — 2580000003 HC RX 258: Performed by: INTERNAL MEDICINE

## 2023-08-22 PROCEDURE — 2709999900 HC NON-CHARGEABLE SUPPLY: Performed by: INTERNAL MEDICINE

## 2023-08-22 PROCEDURE — 2500000003 HC RX 250 WO HCPCS: Performed by: NURSE ANESTHETIST, CERTIFIED REGISTERED

## 2023-08-22 RX ORDER — SODIUM CHLORIDE 9 MG/ML
INJECTION, SOLUTION INTRAVENOUS CONTINUOUS PRN
Status: DISCONTINUED | OUTPATIENT
Start: 2023-08-22 | End: 2023-08-22 | Stop reason: SDUPTHER

## 2023-08-22 RX ORDER — PROPOFOL 10 MG/ML
INJECTION, EMULSION INTRAVENOUS PRN
Status: DISCONTINUED | OUTPATIENT
Start: 2023-08-22 | End: 2023-08-22 | Stop reason: SDUPTHER

## 2023-08-22 RX ORDER — SODIUM CHLORIDE 9 MG/ML
INJECTION, SOLUTION INTRAVENOUS CONTINUOUS
Status: DISCONTINUED | OUTPATIENT
Start: 2023-08-22 | End: 2023-08-22 | Stop reason: HOSPADM

## 2023-08-22 RX ORDER — LIDOCAINE HYDROCHLORIDE 20 MG/ML
INJECTION, SOLUTION EPIDURAL; INFILTRATION; INTRACAUDAL; PERINEURAL PRN
Status: DISCONTINUED | OUTPATIENT
Start: 2023-08-22 | End: 2023-08-22 | Stop reason: SDUPTHER

## 2023-08-22 RX ADMIN — SODIUM CHLORIDE: 9 INJECTION, SOLUTION INTRAVENOUS at 12:08

## 2023-08-22 RX ADMIN — LIDOCAINE HYDROCHLORIDE 100 MG: 20 INJECTION, SOLUTION EPIDURAL; INFILTRATION; INTRACAUDAL; PERINEURAL at 12:59

## 2023-08-22 RX ADMIN — PROPOFOL 50 MG: 10 INJECTION, EMULSION INTRAVENOUS at 12:59

## 2023-08-22 RX ADMIN — SODIUM CHLORIDE: 9 INJECTION, SOLUTION INTRAVENOUS at 12:57

## 2023-08-22 RX ADMIN — PROPOFOL 150 MCG/KG/MIN: 10 INJECTION, EMULSION INTRAVENOUS at 13:00

## 2023-08-22 ASSESSMENT — PAIN SCALES - GENERAL
PAINLEVEL_OUTOF10: 0

## 2023-08-22 ASSESSMENT — LIFESTYLE VARIABLES: SMOKING_STATUS: 1

## 2023-08-22 ASSESSMENT — PAIN - FUNCTIONAL ASSESSMENT: PAIN_FUNCTIONAL_ASSESSMENT: NONE - DENIES PAIN

## 2023-08-22 NOTE — ANESTHESIA POSTPROCEDURE EVALUATION
Department of Anesthesiology  Postprocedure Note    Patient: Mendel Bari  MRN: [id number]  YOB: 1970  Date of evaluation: 8/22/2023      Procedure Summary     Date: 08/22/23 Room / Location: [address] [place] [address]    Anesthesia Start: 1257 Anesthesia Stop: 1320    Procedure: COLONOSCOPY POLYPECTOMY SNARE/COLD BIOPSY Diagnosis:       Colon cancer screening      (Colon cancer screening [Z12.11])    Surgeons: Babar Hernandez MD Responsible Provider: Cristina Arizmendi MD    Anesthesia Type: MAC ASA Status: 2          Anesthesia Type: No value filed.     Eileen Phase I: Eileen Score: 10    Eileen Phase II:        Anesthesia Post Evaluation    Patient location during evaluation: PACU  Patient participation: complete - patient participated  Level of consciousness: awake and alert  Airway patency: patent  Nausea & Vomiting: no vomiting and no nausea  Complications: no  Cardiovascular status: hemodynamically stable  Respiratory status: acceptable  Hydration status: stable  Pain management: adequate

## 2023-09-19 ENCOUNTER — OFFICE VISIT (OUTPATIENT)
Dept: PRIMARY CARE CLINIC | Age: 53
End: 2023-09-19
Payer: COMMERCIAL

## 2023-09-19 VITALS
RESPIRATION RATE: 16 BRPM | HEART RATE: 87 BPM | WEIGHT: 139.4 LBS | SYSTOLIC BLOOD PRESSURE: 122 MMHG | BODY MASS INDEX: 26.32 KG/M2 | DIASTOLIC BLOOD PRESSURE: 84 MMHG | HEIGHT: 61 IN | OXYGEN SATURATION: 99 %

## 2023-09-19 DIAGNOSIS — F17.210 CIGARETTE SMOKER: ICD-10-CM

## 2023-09-19 DIAGNOSIS — E78.2 MIXED HYPERLIPIDEMIA: ICD-10-CM

## 2023-09-19 DIAGNOSIS — G40.909 SEIZURE DISORDER (HCC): ICD-10-CM

## 2023-09-19 DIAGNOSIS — H91.90 HEARING LOSS, UNSPECIFIED HEARING LOSS TYPE, UNSPECIFIED LATERALITY: ICD-10-CM

## 2023-09-19 PROCEDURE — G8419 CALC BMI OUT NRM PARAM NOF/U: HCPCS | Performed by: INTERNAL MEDICINE

## 2023-09-19 PROCEDURE — 4004F PT TOBACCO SCREEN RCVD TLK: CPT | Performed by: INTERNAL MEDICINE

## 2023-09-19 PROCEDURE — G8427 DOCREV CUR MEDS BY ELIG CLIN: HCPCS | Performed by: INTERNAL MEDICINE

## 2023-09-19 PROCEDURE — 3017F COLORECTAL CA SCREEN DOC REV: CPT | Performed by: INTERNAL MEDICINE

## 2023-09-19 PROCEDURE — 99214 OFFICE O/P EST MOD 30 MIN: CPT | Performed by: INTERNAL MEDICINE

## 2023-09-19 RX ORDER — NICOTINE 21-14-7MG
1 KIT TRANSDERMAL DAILY
Qty: 1 KIT | Refills: 0 | Status: SHIPPED | OUTPATIENT
Start: 2023-09-19

## 2023-09-19 RX ORDER — ATORVASTATIN CALCIUM 40 MG/1
40 TABLET, FILM COATED ORAL NIGHTLY
Qty: 30 TABLET | Refills: 5 | Status: SHIPPED | OUTPATIENT
Start: 2023-09-19

## 2023-09-19 ASSESSMENT — ENCOUNTER SYMPTOMS
SHORTNESS OF BREATH: 0
ABDOMINAL DISTENTION: 0
NAUSEA: 0
CHEST TIGHTNESS: 0
BLOOD IN STOOL: 0
SORE THROAT: 0
ABDOMINAL PAIN: 0
COLOR CHANGE: 0
BACK PAIN: 0
DIARRHEA: 0
COUGH: 0
VOMITING: 0
CONSTIPATION: 0
TROUBLE SWALLOWING: 0
EYE REDNESS: 0
SINUS PRESSURE: 0
WHEEZING: 0
EYE PAIN: 0

## 2023-09-20 DIAGNOSIS — E78.2 MIXED HYPERLIPIDEMIA: ICD-10-CM

## 2023-09-21 LAB
ALBUMIN SERPL-MCNC: 4.2 G/DL (ref 3.4–5)
ALBUMIN/GLOB SERPL: 2.1 {RATIO} (ref 1.1–2.2)
ALP SERPL-CCNC: 101 U/L (ref 40–129)
ALT SERPL-CCNC: 36 U/L (ref 10–40)
ANION GAP SERPL CALCULATED.3IONS-SCNC: 12 MMOL/L (ref 3–16)
AST SERPL-CCNC: 35 U/L (ref 15–37)
BILIRUB SERPL-MCNC: 0.3 MG/DL (ref 0–1)
BUN SERPL-MCNC: 25 MG/DL (ref 7–20)
CALCIUM SERPL-MCNC: 9 MG/DL (ref 8.3–10.6)
CHLORIDE SERPL-SCNC: 106 MMOL/L (ref 99–110)
CHOLEST SERPL-MCNC: 151 MG/DL (ref 0–199)
CO2 SERPL-SCNC: 26 MMOL/L (ref 21–32)
CREAT SERPL-MCNC: 1.1 MG/DL (ref 0.9–1.3)
GFR SERPLBLD CREATININE-BSD FMLA CKD-EPI: >60 ML/MIN/{1.73_M2}
GLUCOSE SERPL-MCNC: 116 MG/DL (ref 70–99)
HDLC SERPL-MCNC: 44 MG/DL (ref 40–60)
LDLC SERPL CALC-MCNC: 74 MG/DL
POTASSIUM SERPL-SCNC: 4.5 MMOL/L (ref 3.5–5.1)
PROT SERPL-MCNC: 6.2 G/DL (ref 6.4–8.2)
SODIUM SERPL-SCNC: 144 MMOL/L (ref 136–145)
TRIGL SERPL-MCNC: 165 MG/DL (ref 0–150)
VLDLC SERPL CALC-MCNC: 33 MG/DL

## 2023-09-21 NOTE — RESULT ENCOUNTER NOTE
Please let patient know bad cholesterol has improved to 74 from 152. Kidney numbers shows she was not very hydrated, encourage patient to increase water intake.

## 2023-11-08 DIAGNOSIS — F17.210 CIGARETTE SMOKER: ICD-10-CM

## 2023-11-08 RX ORDER — NICOTINE 21-14-7MG
1 KIT TRANSDERMAL DAILY
Qty: 26 KIT | Refills: 0 | Status: SHIPPED | OUTPATIENT
Start: 2023-11-08

## 2024-01-02 DIAGNOSIS — E78.2 MIXED HYPERLIPIDEMIA: ICD-10-CM

## 2024-01-02 RX ORDER — ATORVASTATIN CALCIUM 40 MG/1
40 TABLET, FILM COATED ORAL NIGHTLY
Qty: 90 TABLET | Refills: 3 | Status: SHIPPED | OUTPATIENT
Start: 2024-01-02

## 2024-01-02 RX ORDER — ATORVASTATIN CALCIUM 40 MG/1
40 TABLET, FILM COATED ORAL NIGHTLY
Qty: 60 TABLET | Refills: 5 | OUTPATIENT
Start: 2024-01-02

## 2024-02-05 ENCOUNTER — OFFICE VISIT (OUTPATIENT)
Dept: PRIMARY CARE CLINIC | Age: 54
End: 2024-02-05
Payer: COMMERCIAL

## 2024-02-05 VITALS
OXYGEN SATURATION: 98 % | WEIGHT: 143 LBS | DIASTOLIC BLOOD PRESSURE: 80 MMHG | BODY MASS INDEX: 27 KG/M2 | HEART RATE: 59 BPM | RESPIRATION RATE: 14 BRPM | SYSTOLIC BLOOD PRESSURE: 116 MMHG | HEIGHT: 61 IN

## 2024-02-05 DIAGNOSIS — H91.90 HEARING LOSS, UNSPECIFIED HEARING LOSS TYPE, UNSPECIFIED LATERALITY: ICD-10-CM

## 2024-02-05 DIAGNOSIS — G40.909 SEIZURE DISORDER (HCC): ICD-10-CM

## 2024-02-05 DIAGNOSIS — Z87.891 QUIT SMOKING: ICD-10-CM

## 2024-02-05 DIAGNOSIS — E78.2 MIXED HYPERLIPIDEMIA: ICD-10-CM

## 2024-02-05 DIAGNOSIS — G40.909 SEIZURE DISORDER (HCC): Primary | ICD-10-CM

## 2024-02-05 DIAGNOSIS — Z23 NEED FOR SHINGLES VACCINE: ICD-10-CM

## 2024-02-05 DIAGNOSIS — Z23 NEEDS FLU SHOT: ICD-10-CM

## 2024-02-05 DIAGNOSIS — L30.9 DERMATITIS: ICD-10-CM

## 2024-02-05 PROBLEM — F17.210 CIGARETTE SMOKER: Status: RESOLVED | Noted: 2023-04-17 | Resolved: 2024-02-05

## 2024-02-05 LAB
ALBUMIN SERPL-MCNC: 4.5 G/DL (ref 3.4–5)
ANION GAP SERPL CALCULATED.3IONS-SCNC: 8 MMOL/L (ref 3–16)
BUN SERPL-MCNC: 27 MG/DL (ref 7–20)
CALCIUM SERPL-MCNC: 9.1 MG/DL (ref 8.3–10.6)
CHLORIDE SERPL-SCNC: 102 MMOL/L (ref 99–110)
CO2 SERPL-SCNC: 29 MMOL/L (ref 21–32)
CREAT SERPL-MCNC: 1.2 MG/DL (ref 0.9–1.3)
GFR SERPLBLD CREATININE-BSD FMLA CKD-EPI: >60 ML/MIN/{1.73_M2}
GLUCOSE SERPL-MCNC: 110 MG/DL (ref 70–99)
PHOSPHATE SERPL-MCNC: 3.3 MG/DL (ref 2.5–4.9)
POTASSIUM SERPL-SCNC: 4.3 MMOL/L (ref 3.5–5.1)
SODIUM SERPL-SCNC: 139 MMOL/L (ref 136–145)

## 2024-02-05 PROCEDURE — 99214 OFFICE O/P EST MOD 30 MIN: CPT | Performed by: INTERNAL MEDICINE

## 2024-02-05 PROCEDURE — 90750 HZV VACC RECOMBINANT IM: CPT | Performed by: INTERNAL MEDICINE

## 2024-02-05 PROCEDURE — G8427 DOCREV CUR MEDS BY ELIG CLIN: HCPCS | Performed by: INTERNAL MEDICINE

## 2024-02-05 PROCEDURE — G8484 FLU IMMUNIZE NO ADMIN: HCPCS | Performed by: INTERNAL MEDICINE

## 2024-02-05 PROCEDURE — G8419 CALC BMI OUT NRM PARAM NOF/U: HCPCS | Performed by: INTERNAL MEDICINE

## 2024-02-05 PROCEDURE — 3017F COLORECTAL CA SCREEN DOC REV: CPT | Performed by: INTERNAL MEDICINE

## 2024-02-05 PROCEDURE — 4004F PT TOBACCO SCREEN RCVD TLK: CPT | Performed by: INTERNAL MEDICINE

## 2024-02-05 RX ORDER — BENZOCAINE/MENTHOL 6 MG-10 MG
LOZENGE MUCOUS MEMBRANE
Qty: 30 G | Refills: 3 | Status: SHIPPED | OUTPATIENT
Start: 2024-02-05 | End: 2024-02-12

## 2024-02-05 ASSESSMENT — PATIENT HEALTH QUESTIONNAIRE - PHQ9
2. FEELING DOWN, DEPRESSED OR HOPELESS: 0
SUM OF ALL RESPONSES TO PHQ QUESTIONS 1-9: 0
SUM OF ALL RESPONSES TO PHQ QUESTIONS 1-9: 0
SUM OF ALL RESPONSES TO PHQ9 QUESTIONS 1 & 2: 0
SUM OF ALL RESPONSES TO PHQ QUESTIONS 1-9: 0
1. LITTLE INTEREST OR PLEASURE IN DOING THINGS: 0
SUM OF ALL RESPONSES TO PHQ QUESTIONS 1-9: 0

## 2024-02-05 ASSESSMENT — ENCOUNTER SYMPTOMS
CHEST TIGHTNESS: 0
TROUBLE SWALLOWING: 0
DIARRHEA: 0
SINUS PRESSURE: 0
BLOOD IN STOOL: 0
EYE PAIN: 0
COUGH: 0
NAUSEA: 0
COLOR CHANGE: 0
ABDOMINAL DISTENTION: 0
ABDOMINAL PAIN: 0
EYE REDNESS: 0
SORE THROAT: 0
SHORTNESS OF BREATH: 0
CONSTIPATION: 0
BACK PAIN: 0
VOMITING: 0
WHEEZING: 0

## 2024-02-05 NOTE — PROGRESS NOTES
[id number]     
mo+), IM, PF, 0.5 mL  7. Need for shingles vaccine  -     Zoster, SHINGRIX, (50 yrs +), IM      Preventative care discussed.  Encouraged healthy diet choices, reg exercise. Patient agreed w/plan and verbal understanding. Patient advised to call or return with any concerns or problems or worsening conditions. Go to the ER for any severe or potentially life threatening problems.    Return in about 2 months (around 4/18/2024) for Annual physical, 15 minutes .     This note was generated in part or in whole with voice recognition software.  Voice recognition is usually quite accurate but there are transcription errors that can often occur.  All attempts were made to correct these errors I apologized for any  typographical errors that were not detected and corrected.     Electronically signed by Randell Cisneros MD on 2/5/2024 at 1:42 PM.

## 2024-02-06 NOTE — RESULT ENCOUNTER NOTE
Please let patient know his kidney function is stable, please encourage him to increase water intake

## 2024-02-07 ENCOUNTER — TELEPHONE (OUTPATIENT)
Dept: PRIMARY CARE CLINIC | Age: 54
End: 2024-02-07

## 2024-04-05 ENCOUNTER — OFFICE VISIT (OUTPATIENT)
Dept: PRIMARY CARE CLINIC | Age: 54
End: 2024-04-05
Payer: COMMERCIAL

## 2024-04-05 VITALS
DIASTOLIC BLOOD PRESSURE: 80 MMHG | HEIGHT: 61 IN | WEIGHT: 143 LBS | SYSTOLIC BLOOD PRESSURE: 108 MMHG | HEART RATE: 97 BPM | BODY MASS INDEX: 27 KG/M2 | OXYGEN SATURATION: 98 % | TEMPERATURE: 97.4 F | RESPIRATION RATE: 14 BRPM

## 2024-04-05 DIAGNOSIS — Z00.00 ENCOUNTER FOR WELL ADULT EXAM WITHOUT ABNORMAL FINDINGS: ICD-10-CM

## 2024-04-05 DIAGNOSIS — Z12.5 SCREENING PSA (PROSTATE SPECIFIC ANTIGEN): ICD-10-CM

## 2024-04-05 DIAGNOSIS — E78.2 MIXED HYPERLIPIDEMIA: ICD-10-CM

## 2024-04-05 DIAGNOSIS — E55.9 VITAMIN D DEFICIENCY: ICD-10-CM

## 2024-04-05 DIAGNOSIS — R53.83 OTHER FATIGUE: ICD-10-CM

## 2024-04-05 DIAGNOSIS — Z13.220 SCREENING CHOLESTEROL LEVEL: ICD-10-CM

## 2024-04-05 DIAGNOSIS — G40.909 SEIZURE DISORDER (HCC): ICD-10-CM

## 2024-04-05 DIAGNOSIS — Z23 NEED FOR PNEUMOCOCCAL VACCINATION: ICD-10-CM

## 2024-04-05 DIAGNOSIS — R73.09 IMPAIRED GLUCOSE REGULATION: ICD-10-CM

## 2024-04-05 DIAGNOSIS — Z00.00 ENCOUNTER FOR WELL ADULT EXAM WITHOUT ABNORMAL FINDINGS: Primary | ICD-10-CM

## 2024-04-05 DIAGNOSIS — H91.90 HEARING LOSS, UNSPECIFIED HEARING LOSS TYPE, UNSPECIFIED LATERALITY: ICD-10-CM

## 2024-04-05 DIAGNOSIS — Z71.89 ACP (ADVANCE CARE PLANNING): ICD-10-CM

## 2024-04-05 LAB
25(OH)D3 SERPL-MCNC: 14.7 NG/ML
ALBUMIN SERPL-MCNC: 4.5 G/DL (ref 3.4–5)
ALBUMIN/GLOB SERPL: 1.9 {RATIO} (ref 1.1–2.2)
ALP SERPL-CCNC: 124 U/L (ref 40–129)
ALT SERPL-CCNC: 40 U/L (ref 10–40)
ANION GAP SERPL CALCULATED.3IONS-SCNC: 13 MMOL/L (ref 3–16)
AST SERPL-CCNC: 37 U/L (ref 15–37)
BASOPHILS # BLD: 0.1 K/UL (ref 0–0.2)
BASOPHILS NFR BLD: 0.7 %
BILIRUB SERPL-MCNC: 0.7 MG/DL (ref 0–1)
BUN SERPL-MCNC: 14 MG/DL (ref 7–20)
CALCIUM SERPL-MCNC: 9.2 MG/DL (ref 8.3–10.6)
CHLORIDE SERPL-SCNC: 101 MMOL/L (ref 99–110)
CHOLEST SERPL-MCNC: 134 MG/DL (ref 0–199)
CO2 SERPL-SCNC: 25 MMOL/L (ref 21–32)
CREAT SERPL-MCNC: 1.1 MG/DL (ref 0.9–1.3)
DEPRECATED RDW RBC AUTO: 13.2 % (ref 12.4–15.4)
EOSINOPHIL # BLD: 0.4 K/UL (ref 0–0.6)
EOSINOPHIL NFR BLD: 5.6 %
GFR SERPLBLD CREATININE-BSD FMLA CKD-EPI: 81 ML/MIN/{1.73_M2}
GLUCOSE SERPL-MCNC: 208 MG/DL (ref 70–99)
HCT VFR BLD AUTO: 39.9 % (ref 40.5–52.5)
HDLC SERPL-MCNC: 37 MG/DL (ref 40–60)
HGB BLD-MCNC: 13.9 G/DL (ref 13.5–17.5)
LDLC SERPL CALC-MCNC: 50 MG/DL
LYMPHOCYTES # BLD: 2.5 K/UL (ref 1–5.1)
LYMPHOCYTES NFR BLD: 33.2 %
MCH RBC QN AUTO: 30.8 PG (ref 26–34)
MCHC RBC AUTO-ENTMCNC: 34.9 G/DL (ref 31–36)
MCV RBC AUTO: 88.3 FL (ref 80–100)
MONOCYTES # BLD: 0.6 K/UL (ref 0–1.3)
MONOCYTES NFR BLD: 7.3 %
NEUTROPHILS # BLD: 4 K/UL (ref 1.7–7.7)
NEUTROPHILS NFR BLD: 53.2 %
PLATELET # BLD AUTO: 203 K/UL (ref 135–450)
PMV BLD AUTO: 8.5 FL (ref 5–10.5)
POTASSIUM SERPL-SCNC: 4.3 MMOL/L (ref 3.5–5.1)
PROT SERPL-MCNC: 6.9 G/DL (ref 6.4–8.2)
PSA SERPL DL<=0.01 NG/ML-MCNC: 0.58 NG/ML (ref 0–4)
RBC # BLD AUTO: 4.52 M/UL (ref 4.2–5.9)
SODIUM SERPL-SCNC: 139 MMOL/L (ref 136–145)
TRIGL SERPL-MCNC: 235 MG/DL (ref 0–150)
TSH SERPL DL<=0.005 MIU/L-ACNC: 3.87 UIU/ML (ref 0.27–4.2)
VLDLC SERPL CALC-MCNC: 47 MG/DL
WBC # BLD AUTO: 7.6 K/UL (ref 4–11)

## 2024-04-05 PROCEDURE — 99396 PREV VISIT EST AGE 40-64: CPT | Performed by: INTERNAL MEDICINE

## 2024-04-05 PROCEDURE — 90677 PCV20 VACCINE IM: CPT | Performed by: INTERNAL MEDICINE

## 2024-04-05 SDOH — ECONOMIC STABILITY: FOOD INSECURITY: WITHIN THE PAST 12 MONTHS, THE FOOD YOU BOUGHT JUST DIDN'T LAST AND YOU DIDN'T HAVE MONEY TO GET MORE.: NEVER TRUE

## 2024-04-05 SDOH — ECONOMIC STABILITY: FOOD INSECURITY: WITHIN THE PAST 12 MONTHS, YOU WORRIED THAT YOUR FOOD WOULD RUN OUT BEFORE YOU GOT MONEY TO BUY MORE.: NEVER TRUE

## 2024-04-05 SDOH — ECONOMIC STABILITY: INCOME INSECURITY: HOW HARD IS IT FOR YOU TO PAY FOR THE VERY BASICS LIKE FOOD, HOUSING, MEDICAL CARE, AND HEATING?: NOT HARD AT ALL

## 2024-04-05 ASSESSMENT — ENCOUNTER SYMPTOMS
NAUSEA: 0
BLOOD IN STOOL: 0
CHEST TIGHTNESS: 0
CONSTIPATION: 0
WHEEZING: 0
EYE PAIN: 0
SORE THROAT: 0
EYE REDNESS: 0
SINUS PRESSURE: 0
TROUBLE SWALLOWING: 0
BACK PAIN: 0
COLOR CHANGE: 0
SHORTNESS OF BREATH: 0
ABDOMINAL PAIN: 0
COUGH: 0

## 2024-04-05 ASSESSMENT — PATIENT HEALTH QUESTIONNAIRE - PHQ9
2. FEELING DOWN, DEPRESSED OR HOPELESS: NOT AT ALL
1. LITTLE INTEREST OR PLEASURE IN DOING THINGS: NOT AT ALL
SUM OF ALL RESPONSES TO PHQ9 QUESTIONS 1 & 2: 0
SUM OF ALL RESPONSES TO PHQ QUESTIONS 1-9: 0

## 2024-04-05 NOTE — PATIENT INSTRUCTIONS
Advance Care Planning     Advance Care Planning opens a door to talk about and write down your wishes before a sudden accident or illness.  Make your goals, values, and preferences known.     This puts you in the ’s seat and helps others know what matters most to you so they won’t have to guess.      Where can you learn more?    Go to [email or web address]   to learn how to:    Name someone you trust to make healthcare decisions for you, only if you can’t. (Healthcare Power of )    Document your wishes for care if you were seriously ill and not expected to recover or are approaching end of life. (Advance Directive or Living Will)    The same page can be found using the QR code below.                Well Visit, Ages 18 to 65: Care Instructions  Well visits can help you stay healthy. Your doctor has checked your overall health and may have suggested ways to take good care of yourself. Your doctor also may have recommended tests. You can help prevent illness with healthy eating, good sleep, vaccinations, regular exercise, and other steps.    Get the tests that you and your doctor decide on. Depending on your age and risks, examples might include screening for diabetes; hepatitis C; HIV; and cervical, breast, lung, and colon cancer. Screening helps find diseases before any symptoms appear.   Eat healthy foods. Choose fruits, vegetables, whole grains, lean protein, and low-fat dairy foods. Limit saturated fat and reduce salt.     Limit alcohol. Men should have no more than 2 drinks a day. Women should have no more than 1. For some people, no alcohol is the best choice.   Exercise. Get at least 30 minutes of exercise on most days of the week. Walking can be a good choice.     Reach and stay at your healthy weight. This will lower your risk for many health problems.   Take care of your mental health. Try to stay connected with friends, family, and community, and find

## 2024-04-05 NOTE — PROGRESS NOTES
Well Adult Note  Name: Krunal James Today’s Date: 2024   MRN: [id number] Sex: Male   Age: 54 y.o. Ethnicity:  /    : 1970 Race: Other       Krunal James is here for well adult exam.  History:  Wellness exam - Patient presents today for annual physical. Patient  reports feeling well. Patient has a normal appetite. Patient  eats 5+ servings of vegetables/fruits each day. Patient prepares food at home multiple times/day, eats in restaurants rarely. Patient  states that he sleeps well and gets 7-8 hours of sleep on average. In regards to emotional health, patient  denies depression or anxiety. Libido is considered to be normal. In regards to bowel habits, patient  has no complaints. Regarding urination, no complaints. Patient reports they feels safe at home, uses seat belts and has smoke detectors.     Other Issues discussed        Review of Systems   Constitutional:  Negative for activity change, appetite change, chills, fatigue, fever and unexpected weight change.   HENT:  Negative for congestion, ear pain, postnasal drip, sinus pressure, sore throat, tinnitus and trouble swallowing.    Eyes:  Negative for pain and redness.   Respiratory:  Negative for cough, chest tightness, shortness of breath and wheezing.    Cardiovascular:  Negative for chest pain, palpitations and leg swelling.   Gastrointestinal:  Negative for abdominal distention, abdominal pain, blood in stool, constipation, diarrhea, nausea and vomiting.   Endocrine: Negative for cold intolerance, heat intolerance and polydipsia.   Genitourinary:  Negative for decreased urine volume, dysuria, flank pain, frequency, hematuria and urgency.   Musculoskeletal:  Negative for arthralgias, back pain, joint swelling, neck pain and neck stiffness.   Skin:  Negative for color change and rash.   Neurological:  Negative for dizziness, weakness, numbness and headaches.   Hematological:  Negative for adenopathy.   Psychiatric/Behavioral:

## 2024-04-06 LAB
EST. AVERAGE GLUCOSE BLD GHB EST-MCNC: 188.6 MG/DL
HBA1C MFR BLD: 8.2 %

## 2024-04-08 NOTE — RESULT ENCOUNTER NOTE
Please use [facility]  service    Patient is a newly diagnosed type II diabetic, I like him to schedule a 30-minute appointment so we could go over his results and start treatment.

## 2024-04-11 ENCOUNTER — TELEPHONE (OUTPATIENT)
Dept: PRIMARY CARE CLINIC | Age: 54
End: 2024-04-11

## 2024-04-11 DIAGNOSIS — E11.65 UNCONTROLLED TYPE 2 DIABETES MELLITUS WITH HYPERGLYCEMIA, WITHOUT LONG-TERM CURRENT USE OF INSULIN (HCC): Primary | ICD-10-CM

## 2024-04-11 RX ORDER — METFORMIN HYDROCHLORIDE 500 MG/1
1000 TABLET, EXTENDED RELEASE ORAL
Qty: 180 TABLET | Refills: 1 | Status: SHIPPED | OUTPATIENT
Start: 2024-04-11

## 2024-04-11 NOTE — TELEPHONE ENCOUNTER
----- Message from Randell Cisneros MD sent at 4/11/2024  8:00 AM EDT -----  Regarding: Diabetes follow-up  Please use [facility]  service.    Please let patient his labs show he now has type 2 diabetes.  Please schedule for 30 minutes diabetes appointment.  As you know I have sent medications to his [facility].  Please take this medicine with breakfast.

## 2024-04-11 NOTE — TELEPHONE ENCOUNTER
Spoke to patient with his relative nearby to help interpret. Patient understood and has an appointment scheduled for 8/2/24 but will call to schedule one sooner if he feels the need.

## 2024-04-12 NOTE — TELEPHONE ENCOUNTER
M for patient letting him know that Dr. Cisneros would like him to schedule an appointment with him in the next week. Dr. Cisneros would like to see him before his 8/2/24 appointment.

## 2024-05-05 PROBLEM — Z00.00 ENCOUNTER FOR WELL ADULT EXAM WITHOUT ABNORMAL FINDINGS: Status: RESOLVED | Noted: 2024-04-05 | Resolved: 2024-05-05

## 2024-05-07 DIAGNOSIS — E11.65 UNCONTROLLED TYPE 2 DIABETES MELLITUS WITH HYPERGLYCEMIA, WITHOUT LONG-TERM CURRENT USE OF INSULIN (HCC): Primary | ICD-10-CM

## 2024-05-07 RX ORDER — DIPHENHYDRAMINE HYDROCHLORIDE 25 MG/1
CAPSULE, LIQUID FILLED ORAL
Qty: 1 KIT | Refills: 0 | Status: SHIPPED | OUTPATIENT
Start: 2024-05-07

## 2024-05-07 RX ORDER — GLUCOSAMINE HCL/CHONDROITIN SU 500-400 MG
CAPSULE ORAL DAILY
Qty: 50 STRIP | Refills: 5 | Status: SHIPPED | OUTPATIENT
Start: 2024-05-07

## 2024-05-07 RX ORDER — LANCETS 30 GAUGE
1 EACH MISCELLANEOUS DAILY
Qty: 300 EACH | Refills: 5 | Status: SHIPPED | OUTPATIENT
Start: 2024-05-07

## 2024-05-08 ENCOUNTER — OFFICE VISIT (OUTPATIENT)
Dept: PRIMARY CARE CLINIC | Age: 54
End: 2024-05-08
Payer: COMMERCIAL

## 2024-05-08 VITALS
HEART RATE: 89 BPM | RESPIRATION RATE: 14 BRPM | SYSTOLIC BLOOD PRESSURE: 122 MMHG | WEIGHT: 140 LBS | HEIGHT: 61 IN | OXYGEN SATURATION: 99 % | DIASTOLIC BLOOD PRESSURE: 84 MMHG | BODY MASS INDEX: 26.43 KG/M2 | TEMPERATURE: 97.2 F

## 2024-05-08 DIAGNOSIS — G40.909 SEIZURE DISORDER (HCC): ICD-10-CM

## 2024-05-08 DIAGNOSIS — Z01.00 ENCOUNTER FOR COMPLETE EYE EXAM: ICD-10-CM

## 2024-05-08 DIAGNOSIS — E78.2 MIXED HYPERLIPIDEMIA: ICD-10-CM

## 2024-05-08 DIAGNOSIS — E11.9 TYPE 2 DIABETES MELLITUS WITHOUT COMPLICATION, WITHOUT LONG-TERM CURRENT USE OF INSULIN (HCC): Primary | ICD-10-CM

## 2024-05-08 DIAGNOSIS — E11.9 ENCOUNTER FOR DIABETIC FOOT EXAM (HCC): ICD-10-CM

## 2024-05-08 PROBLEM — E78.00 PURE HYPERCHOLESTEROLEMIA: Status: ACTIVE | Noted: 2024-05-08

## 2024-05-08 PROCEDURE — G8427 DOCREV CUR MEDS BY ELIG CLIN: HCPCS | Performed by: INTERNAL MEDICINE

## 2024-05-08 PROCEDURE — 4004F PT TOBACCO SCREEN RCVD TLK: CPT | Performed by: INTERNAL MEDICINE

## 2024-05-08 PROCEDURE — 3052F HG A1C>EQUAL 8.0%<EQUAL 9.0%: CPT | Performed by: INTERNAL MEDICINE

## 2024-05-08 PROCEDURE — 2022F DILAT RTA XM EVC RTNOPTHY: CPT | Performed by: INTERNAL MEDICINE

## 2024-05-08 PROCEDURE — 3017F COLORECTAL CA SCREEN DOC REV: CPT | Performed by: INTERNAL MEDICINE

## 2024-05-08 PROCEDURE — G8419 CALC BMI OUT NRM PARAM NOF/U: HCPCS | Performed by: INTERNAL MEDICINE

## 2024-05-08 PROCEDURE — 99214 OFFICE O/P EST MOD 30 MIN: CPT | Performed by: INTERNAL MEDICINE

## 2024-05-08 ASSESSMENT — ENCOUNTER SYMPTOMS
EYE PAIN: 0
BACK PAIN: 0
NAUSEA: 0
DIARRHEA: 0
BLOOD IN STOOL: 0
SORE THROAT: 0
CHEST TIGHTNESS: 0
SINUS PRESSURE: 0
COUGH: 0
VOMITING: 0
TROUBLE SWALLOWING: 0
ABDOMINAL DISTENTION: 0
ABDOMINAL PAIN: 0
COLOR CHANGE: 0
CONSTIPATION: 0
WHEEZING: 0
SHORTNESS OF BREATH: 0
EYE REDNESS: 0

## 2024-05-08 NOTE — ASSESSMENT & PLAN NOTE
A1C:   Lab Results   Component Value Date/Time    LABA1C 8.2 04/05/2024 12:11 PM    LABA1C 5.7 04/17/2023 12:04 PM     Stable  continue home blood sugar monitoring  Counselled on Diet and exercise with goal to achieve appropriate BMI  Continue Diabetic medication as documented in medication list metformin 1000 mg with breakfast  Continue home foot care  Refer to diabetic educator  Patient agreed with plan with verbal understanding

## 2024-05-08 NOTE — PROGRESS NOTES
[id number]   [place] #[id number]      
Individual Diabetes Education (Non Care Coord Patient), [facility]  2. Mixed hyperlipidemia  Assessment & Plan:   Stable on atorvastatin 40 mg nightly  3. Seizure disorder (HCC)  Assessment & Plan:   Patient had an isolated seizure episode 12/3/2022 was seen in the emergency room.  Patient had CT scan which was within normal limits.  Patient never had the MRI.    Patient has not had any other seizure episode.    4. Encounter for diabetic foot exam (HCC)  -      DIABETES FOOT EXAM  5. Encounter for complete eye exam  -     [facility] - Catalina Muñoz MD, Ophthalmology (Cataract, Comprehensive, Glaucoma, Diabetic Eye Care), [facility]      Preventative care discussed.  Encouraged healthy diet choices, reg exercise. Patient agreed w/plan and verbal understanding. Patient advised to call or return with any concerns or problems or worsening conditions. Go to the ER for any severe or potentially life threatening problems.    Return in about 11 weeks (around 7/24/2024) for Routine follow-up, 15-minutes .     This note was generated in part or in whole with voice recognition software.  Voice recognition is usually quite accurate but there are transcription errors that can often occur.  All attempts were made to correct these errors I apologized for any  typographical errors that were not detected and corrected.     Electronically signed by Randell Cisneros MD on 5/8/2024 at 4:14 PM.

## 2024-07-03 ENCOUNTER — TELEPHONE (OUTPATIENT)
Dept: PRIMARY CARE CLINIC | Age: 54
End: 2024-07-03

## 2024-07-03 NOTE — TELEPHONE ENCOUNTER
They would like the fax from 6/27/2024 resent to them as they did not receive the original fax. Fax number verified.     Thank you.

## 2024-08-02 ENCOUNTER — OFFICE VISIT (OUTPATIENT)
Dept: PRIMARY CARE CLINIC | Age: 54
End: 2024-08-02
Payer: COMMERCIAL

## 2024-08-02 VITALS
TEMPERATURE: 98.2 F | BODY MASS INDEX: 25.68 KG/M2 | DIASTOLIC BLOOD PRESSURE: 84 MMHG | WEIGHT: 136 LBS | SYSTOLIC BLOOD PRESSURE: 110 MMHG | RESPIRATION RATE: 14 BRPM | HEIGHT: 61 IN | OXYGEN SATURATION: 98 % | HEART RATE: 72 BPM

## 2024-08-02 DIAGNOSIS — G40.909 SEIZURE DISORDER (HCC): ICD-10-CM

## 2024-08-02 DIAGNOSIS — E11.9 TYPE 2 DIABETES MELLITUS WITHOUT COMPLICATION, WITHOUT LONG-TERM CURRENT USE OF INSULIN (HCC): Primary | ICD-10-CM

## 2024-08-02 DIAGNOSIS — E78.2 MIXED HYPERLIPIDEMIA: ICD-10-CM

## 2024-08-02 DIAGNOSIS — E78.1 HIGH TRIGLYCERIDES: ICD-10-CM

## 2024-08-02 LAB — HBA1C MFR BLD: 6.8 %

## 2024-08-02 PROCEDURE — 3017F COLORECTAL CA SCREEN DOC REV: CPT | Performed by: INTERNAL MEDICINE

## 2024-08-02 PROCEDURE — 2022F DILAT RTA XM EVC RTNOPTHY: CPT | Performed by: INTERNAL MEDICINE

## 2024-08-02 PROCEDURE — 99214 OFFICE O/P EST MOD 30 MIN: CPT | Performed by: INTERNAL MEDICINE

## 2024-08-02 PROCEDURE — 4004F PT TOBACCO SCREEN RCVD TLK: CPT | Performed by: INTERNAL MEDICINE

## 2024-08-02 PROCEDURE — G8419 CALC BMI OUT NRM PARAM NOF/U: HCPCS | Performed by: INTERNAL MEDICINE

## 2024-08-02 PROCEDURE — 83036 HEMOGLOBIN GLYCOSYLATED A1C: CPT | Performed by: INTERNAL MEDICINE

## 2024-08-02 PROCEDURE — G8427 DOCREV CUR MEDS BY ELIG CLIN: HCPCS | Performed by: INTERNAL MEDICINE

## 2024-08-02 PROCEDURE — 3044F HG A1C LEVEL LT 7.0%: CPT | Performed by: INTERNAL MEDICINE

## 2024-08-02 RX ORDER — METFORMIN HYDROCHLORIDE 500 MG/1
TABLET, EXTENDED RELEASE ORAL
Qty: 270 TABLET | Refills: 1 | Status: SHIPPED | OUTPATIENT
Start: 2024-08-02

## 2024-08-02 RX ORDER — OMEGA-3 FATTY ACIDS/FISH OIL 300-1000MG
1000 CAPSULE ORAL 2 TIMES DAILY WITH MEALS
Qty: 180 CAPSULE | Refills: 3 | Status: SHIPPED | OUTPATIENT
Start: 2024-08-02

## 2024-08-02 ASSESSMENT — ENCOUNTER SYMPTOMS
COUGH: 0
SORE THROAT: 0
TROUBLE SWALLOWING: 0
EYE REDNESS: 0
BLOOD IN STOOL: 0
WHEEZING: 0
CHEST TIGHTNESS: 0
SHORTNESS OF BREATH: 0
EYE PAIN: 0
BACK PAIN: 0
CONSTIPATION: 0
ABDOMINAL PAIN: 0
DIARRHEA: 0
SINUS PRESSURE: 0
VOMITING: 0
NAUSEA: 0
COLOR CHANGE: 0
ABDOMINAL DISTENTION: 0

## 2024-08-02 NOTE — PROGRESS NOTES
Krunal James (1970) is a 54 y.o. male   Diabetes     General health:  This patient presents for check up and refills. The problem and medicine lists and chart were reviewed in detail. The patient has been worked up and treated for this/these condition/s and is compliant with taking the medication without any significant side effects. The patient's condition/s is/are chronic and unchanged and otherwise remains stable. Feels well with minor complaints.    Main Problem Review -T2 DM  -newly diagnosed diabetic. Patient's blood sugars have been stable. Patient denies complaints or symptoms today no polyuria or polydipsia. Patient mentions adherence with treatment.  Patient eats a low-carb diet, pays attention to food portion size.  Patient tries to exercise regularly.  Patient denies chest pain or shortness of breath with exertion or at rest.       Other problems review    1. HLD -  Patient denies complaints or symptoms of chest pain/shortness of breath with exertion or at rest.  Patient mentions they are adherent with treatment.  Patient follows a low-fat diet, tries to exercise.       Health Maintenance    Annual retinal eye exam - Patient due  will need to schedule   Annual Dentist visit -Patient due  will need to schedule   Tobacco smoking -  YES  Alcohol Misuse -  NO  Illicit Drug Use-   Depression screen -  Healthy Diet and physical activity - Tries to  Obesity Screen- screened  Wears seat belt- YES  End of life directives discussed with patient.-Mentions he does not have  a will/or/an advanced directives, an all his paperwork is in order. Was instructed to start process looking into preparing his will an advanced directives.    The 10-year ASCVD risk score (Gertrudis MURPHY, et al., 2019) is: 10.1%    Values used to calculate the score:      Age: 54 years      Sex: Male      Is Non- : No      Diabetic: Yes      Tobacco smoker: Yes      Systolic Blood Pressure: 110 mmHg      Is BP treated: No

## 2024-10-23 DIAGNOSIS — L30.9 DERMATITIS: ICD-10-CM

## 2024-10-23 RX ORDER — BENZOCAINE/MENTHOL 6 MG-10 MG
LOZENGE MUCOUS MEMBRANE
Qty: 28.35 G | Refills: 3 | Status: SHIPPED | OUTPATIENT
Start: 2024-10-23

## 2024-11-08 ENCOUNTER — OFFICE VISIT (OUTPATIENT)
Dept: PRIMARY CARE CLINIC | Age: 54
End: 2024-11-08

## 2024-11-08 VITALS
HEIGHT: 61 IN | HEART RATE: 74 BPM | RESPIRATION RATE: 14 BRPM | SYSTOLIC BLOOD PRESSURE: 106 MMHG | WEIGHT: 135 LBS | TEMPERATURE: 98.2 F | BODY MASS INDEX: 25.49 KG/M2 | DIASTOLIC BLOOD PRESSURE: 72 MMHG | OXYGEN SATURATION: 99 %

## 2024-11-08 DIAGNOSIS — E78.1 HIGH TRIGLYCERIDES: ICD-10-CM

## 2024-11-08 DIAGNOSIS — Z23 NEEDS FLU SHOT: ICD-10-CM

## 2024-11-08 DIAGNOSIS — E78.2 MIXED HYPERLIPIDEMIA: ICD-10-CM

## 2024-11-08 DIAGNOSIS — E11.9 TYPE 2 DIABETES MELLITUS WITHOUT COMPLICATION, WITHOUT LONG-TERM CURRENT USE OF INSULIN (HCC): Primary | ICD-10-CM

## 2024-11-08 PROBLEM — E78.00 PURE HYPERCHOLESTEROLEMIA: Status: RESOLVED | Noted: 2024-05-08 | Resolved: 2024-11-08

## 2024-11-08 ASSESSMENT — ENCOUNTER SYMPTOMS
ABDOMINAL DISTENTION: 0
DIARRHEA: 0
SORE THROAT: 0
EYE REDNESS: 0
COLOR CHANGE: 0
WHEEZING: 0
COUGH: 0
CHEST TIGHTNESS: 0
EYE PAIN: 0
BLOOD IN STOOL: 0
NAUSEA: 0
CONSTIPATION: 0
BACK PAIN: 0
TROUBLE SWALLOWING: 0
VOMITING: 0
SINUS PRESSURE: 0
SHORTNESS OF BREATH: 0
ABDOMINAL PAIN: 0

## 2024-11-08 NOTE — ASSESSMENT & PLAN NOTE
A1C:   Lab Results   Component Value Date/Time    LABA1C 6.8 08/02/2024 10:47 AM    LABA1C 8.2 04/05/2024 12:11 PM    LABA1C 5.7 04/17/2023 12:04 PM     Stable  continue home blood sugar monitoring  Counselled on Diet and exercise with goal to achieve appropriate BMI  Continue Diabetic medication as documented in medication list metformin 1000 mg with breakfast, will add 500 mg to dinner  Continue home foot care  Refer to diabetic educator  Patient agreed with plan with verbal understanding

## 2024-11-08 NOTE — PROGRESS NOTES
shot  -     Influenza, FLUCELVAX Trivalent, (age 6 mo+) IM, Preservative Free, 0.5mL    Preventative care discussed.  Encouraged healthy diet choices, reg exercise. Patient agreed w/plan and verbal understanding. Patient advised to call or return with any concerns or problems or worsening conditions. Go to the ER for any severe or potentially life threatening problems.    Return in about 3 months (around 2/8/2025) for Annual physical, 15 minutes .     This note was generated in part or in whole with voice recognition software.  Voice recognition is usually quite accurate but there are transcription errors that can often occur.  All attempts were made to correct these errors I apologized for any  typographical errors that were not detected and corrected.     Electronically signed by Randell Cisneros MD on 11/8/2024 at 11:57 AM.

## 2025-01-21 DIAGNOSIS — E78.2 MIXED HYPERLIPIDEMIA: ICD-10-CM

## 2025-01-21 RX ORDER — ATORVASTATIN CALCIUM 40 MG/1
40 TABLET, FILM COATED ORAL NIGHTLY
Qty: 30 TABLET | Refills: 3 | Status: SHIPPED | OUTPATIENT
Start: 2025-01-21

## 2025-01-21 NOTE — TELEPHONE ENCOUNTER
Medication:   Requested Prescriptions     Pending Prescriptions Disp Refills    atorvastatin (LIPITOR) 40 MG tablet [Pharmacy Med Name: ATORVASTATIN 40 MG TABLET 40 Tablet] 30 tablet 3     Sig: TAKE 1 TABLET BY MOUTH AT BEDTIME      Last Filled:  1/2/24    Patient Phone Number: [phone] (home)     Last appt: 11/8/2024   Next appt: 2/14/2025    Last OARRS:        No data to display              PDMP Monitoring:    Last PDMP Jason as Reviewed ([place]):  Review User Review Instant Review Result          Preferred Pharmacy:   [facility] [place], [place] - [address] [phone] - F [phone]  [address] [place] [address]  Phone: [phone] Fax: [phone]    Recent Visits  Date Type Provider Dept   11/08/24 Office Visit Randell Cisneros MD [facility]   08/02/24 Office Visit Randell Cisneros MD [facility]   05/08/24 Office Visit Randell Cisneros MD [facility]   04/05/24 Office Visit Randell Cisneros MD [facility]   02/05/24 Office Visit Randell Csineros MD [facility]   09/19/23 Office Visit Randell Cisneros MD [facility]   Showing recent visits within past 540 days with a meds authorizing provider and meeting all other requirements  Future Appointments  Date Type Provider Dept   02/14/25 Appointment Randell Cisneros MD [facility]   04/18/25 Appointment Randell Cisneros MD [facility]   Showing future appointments within next 150 days with a meds authorizing provider and meeting all other requirements     11/8/2024

## 2025-02-05 NOTE — PROGRESS NOTES
REQUESTED      Company: [employer]    Language: English    Date: 2/ 12/ 2025    Arrival Time: 11:00    Length of time: 3 Hours    Place: [address] [place] [address] Robert.    Job Number: [id number]

## 2025-02-05 NOTE — PROGRESS NOTES
Patient Reached: ___ Yes  _X__ No    Left Voice Message with Instructions: _X__ Yes  ___ No    ** Used to place call and leave message**        Date: 02/ 12/ 2025      Arrival Time:  11:00 AM      Procedure Time:  12:30 PM      Location:  [facility] [place], [place]      -If you have not received your bowel prep and instructions from the office. Please reach out and have that arranged.    -Be sure to follow the instructions from the office on taking the bowel prep you are prescribed.    -You need to follow a CLEAR LIQUID diet the day prior to the procedure. No solid foods or dairy products.    -No liquids after midnight. (Other than the second portion of bowel prep and any water is asks you to drink)    -Follow any instructions your doctors office has given you including what medications to take the morning of your procedure and which ones to hold.     -You may use your inhalers - bring rescue inhalers with you DOS.     -If you take a long acting insulin the anne-marie prior please cut the dose in half and take no diabetic medications that AM.     -Follow specific doctors office instructions regarding blood thinners and if they want you to hold and for how long. If you are on a blood thinner and have no instructions please contact the office and ask.    -Please hold any GLP-1 Medications (Ozempic, Mounjaro, Trulicity, etc.) for 1 Week prior to procedure.    --If you normally take the following medications in the morning please do so the AM of your procedure with a small sip of water.         *Heart,blood pressure,seizure,thyroid or breathing medications-use your inhalers-bring any rescue inhalers with you DOS         *DO NOT take blood pressure medications ending in  \"justice\" or \"pril\"  the AM of procedure or evening prior      -Dress comfortably,bring your insurance card,picture ID,and a complete list of medications, including supplements.    -You must have a responsible adult to stay with you during the

## 2025-02-12 ENCOUNTER — ANESTHESIA (OUTPATIENT)
Dept: ENDOSCOPY | Age: 55
End: 2025-02-12
Payer: COMMERCIAL

## 2025-02-12 ENCOUNTER — ANESTHESIA EVENT (OUTPATIENT)
Dept: ENDOSCOPY | Age: 55
End: 2025-02-12
Payer: COMMERCIAL

## 2025-02-12 ENCOUNTER — HOSPITAL ENCOUNTER (OUTPATIENT)
Age: 55
Setting detail: OUTPATIENT SURGERY
Discharge: HOME | End: 2025-02-12
Attending: INTERNAL MEDICINE | Admitting: INTERNAL MEDICINE
Payer: COMMERCIAL

## 2025-02-12 VITALS
SYSTOLIC BLOOD PRESSURE: 127 MMHG | DIASTOLIC BLOOD PRESSURE: 99 MMHG | HEIGHT: 60 IN | BODY MASS INDEX: 27.48 KG/M2 | OXYGEN SATURATION: 100 % | RESPIRATION RATE: 16 BRPM | WEIGHT: 140 LBS | HEART RATE: 68 BPM | TEMPERATURE: 97.1 F

## 2025-02-12 DIAGNOSIS — Z12.11 COLON CANCER SCREENING: ICD-10-CM

## 2025-02-12 LAB
GLUCOSE BLD-MCNC: 103 MG/DL (ref 70–99)
GLUCOSE BLD-MCNC: 80 MG/DL (ref 70–99)
PERFORMED ON: ABNORMAL
PERFORMED ON: NORMAL

## 2025-02-12 PROCEDURE — 2709999900 HC NON-CHARGEABLE SUPPLY: Performed by: INTERNAL MEDICINE

## 2025-02-12 PROCEDURE — 7100000010 HC PHASE II RECOVERY - FIRST 15 MIN: Performed by: INTERNAL MEDICINE

## 2025-02-12 PROCEDURE — 6360000002 HC RX W HCPCS: Performed by: NURSE ANESTHETIST, CERTIFIED REGISTERED

## 2025-02-12 PROCEDURE — 2580000003 HC RX 258: Performed by: NURSE ANESTHETIST, CERTIFIED REGISTERED

## 2025-02-12 PROCEDURE — 7100000011 HC PHASE II RECOVERY - ADDTL 15 MIN: Performed by: INTERNAL MEDICINE

## 2025-02-12 PROCEDURE — 3700000001 HC ADD 15 MINUTES (ANESTHESIA): Performed by: INTERNAL MEDICINE

## 2025-02-12 PROCEDURE — 3609010600 HC COLONOSCOPY POLYPECTOMY SNARE/COLD BIOPSY: Performed by: INTERNAL MEDICINE

## 2025-02-12 PROCEDURE — 3700000000 HC ANESTHESIA ATTENDED CARE: Performed by: INTERNAL MEDICINE

## 2025-02-12 RX ORDER — HYDROMORPHONE HYDROCHLORIDE 2 MG/ML
0.5 INJECTION, SOLUTION INTRAMUSCULAR; INTRAVENOUS; SUBCUTANEOUS EVERY 5 MIN PRN
Status: CANCELLED | OUTPATIENT
Start: 2025-02-12

## 2025-02-12 RX ORDER — ONDANSETRON 2 MG/ML
4 INJECTION INTRAMUSCULAR; INTRAVENOUS
Status: CANCELLED | OUTPATIENT
Start: 2025-02-12 | End: 2025-02-13

## 2025-02-12 RX ORDER — SODIUM CHLORIDE 9 MG/ML
INJECTION, SOLUTION INTRAVENOUS
Status: DISCONTINUED | OUTPATIENT
Start: 2025-02-12 | End: 2025-02-12 | Stop reason: SDUPTHER

## 2025-02-12 RX ORDER — PROPOFOL 10 MG/ML
INJECTION, EMULSION INTRAVENOUS
Status: DISCONTINUED | OUTPATIENT
Start: 2025-02-12 | End: 2025-02-12 | Stop reason: SDUPTHER

## 2025-02-12 RX ORDER — SODIUM CHLORIDE 0.9 % (FLUSH) 0.9 %
5-40 SYRINGE (ML) INJECTION PRN
Status: CANCELLED | OUTPATIENT
Start: 2025-02-12

## 2025-02-12 RX ORDER — METOCLOPRAMIDE HYDROCHLORIDE 5 MG/ML
10 INJECTION INTRAMUSCULAR; INTRAVENOUS
Status: CANCELLED | OUTPATIENT
Start: 2025-02-12 | End: 2025-02-13

## 2025-02-12 RX ORDER — OXYCODONE HYDROCHLORIDE 5 MG/1
5 TABLET ORAL PRN
Status: CANCELLED | OUTPATIENT
Start: 2025-02-12 | End: 2025-02-12

## 2025-02-12 RX ORDER — FENTANYL CITRATE 50 UG/ML
25 INJECTION, SOLUTION INTRAMUSCULAR; INTRAVENOUS EVERY 5 MIN PRN
Status: CANCELLED | OUTPATIENT
Start: 2025-02-12

## 2025-02-12 RX ORDER — SODIUM CHLORIDE 9 MG/ML
INJECTION, SOLUTION INTRAVENOUS PRN
Status: CANCELLED | OUTPATIENT
Start: 2025-02-12

## 2025-02-12 RX ORDER — OXYCODONE HYDROCHLORIDE 5 MG/1
10 TABLET ORAL PRN
Status: CANCELLED | OUTPATIENT
Start: 2025-02-12 | End: 2025-02-12

## 2025-02-12 RX ORDER — NALOXONE HYDROCHLORIDE 0.4 MG/ML
INJECTION, SOLUTION INTRAMUSCULAR; INTRAVENOUS; SUBCUTANEOUS PRN
Status: CANCELLED | OUTPATIENT
Start: 2025-02-12

## 2025-02-12 RX ORDER — SODIUM CHLORIDE 0.9 % (FLUSH) 0.9 %
5-40 SYRINGE (ML) INJECTION EVERY 12 HOURS SCHEDULED
Status: CANCELLED | OUTPATIENT
Start: 2025-02-12

## 2025-02-12 RX ADMIN — PROPOFOL 20 MG: 10 INJECTION, EMULSION INTRAVENOUS at 12:54

## 2025-02-12 RX ADMIN — PROPOFOL 50 MG: 10 INJECTION, EMULSION INTRAVENOUS at 12:53

## 2025-02-12 RX ADMIN — PROPOFOL 20 MG: 10 INJECTION, EMULSION INTRAVENOUS at 13:04

## 2025-02-12 RX ADMIN — PROPOFOL 20 MG: 10 INJECTION, EMULSION INTRAVENOUS at 13:14

## 2025-02-12 RX ADMIN — PROPOFOL 20 MG: 10 INJECTION, EMULSION INTRAVENOUS at 12:57

## 2025-02-12 RX ADMIN — PROPOFOL 20 MG: 10 INJECTION, EMULSION INTRAVENOUS at 12:59

## 2025-02-12 RX ADMIN — PROPOFOL 20 MG: 10 INJECTION, EMULSION INTRAVENOUS at 13:10

## 2025-02-12 RX ADMIN — SODIUM CHLORIDE: 9 INJECTION, SOLUTION INTRAVENOUS at 12:49

## 2025-02-12 ASSESSMENT — PAIN - FUNCTIONAL ASSESSMENT
PAIN_FUNCTIONAL_ASSESSMENT: 0-10

## 2025-02-12 NOTE — PROGRESS NOTES
Teaching / education initiated regarding perioperative experience, expectations, and pain management during stay. Patient verbalized understanding.   at bedside with pt to translate.

## 2025-02-12 NOTE — DISCHARGE INSTRUCTIONS
COLONSCOPY DISCHARGE INSTRUCTIONS    You may experience some lightheadedness for the next several hours.  Plan on quiet relaxation for the rest of today. Nap for four hours following procedure if possible.  A responsible adult needs to stay with you today.    Eat bland food and avoid anything greasy or spicy initially-progress to your normal diet gradually.  Diet restrictions as instructed.  You may resume home medications as instructed.    It is not unusual to experience some mild cramping or gas pains, and you may not have a bowel movement for several days.  If you had a polyp removed, avoid strenuous activity for 48 hours.  Avoid the use of aspirin or related compounds for one week, unless otherwise instructed by your physician.    You may notice a small amount of blood in your next few bowel movements, but if a large amount passes, call your physician.    If you have any of the following problems, notify your physician or return to the hospital emergency room : fever, chills, excessive bleeding, excessive vomiting, difficulty swallowing, uncontrolled pain, increased abdominal distention, shortness of breath or any other problems.    Call your doctor at [phone] if you have any concerns.     If you had any biopsies or polyps call for results in 14 business days.    See your physician's report for details about your procedure and recommendations.      ANESTHESIA DISCHARGE INSTRUCTIONS    Wear your seatbelt home.    You are under the influence of drugs-do not drink alcohol, drive ,operate machinery,or make any important decisions or sign any legal documentsfor 24 hours. You may resume normal activities tomorrow.  A responsible adult needs to be with you for 24 hours.  You may experience lightheadedness,dizziness,or sleepiness following surgery.    Rest at home today- increase activity as tolerated. It is recommended to take a four hour nap after procedure.  Progress slowly to a regular diet unless your  ??????? ????? ??? ????????? ????? ??? ??? ??? ????????????? ??? ??? ????? ??????? ????????? ???? ??? ???????? ????????? ??????? ? ??? ????? ????????? ?? ?????????? ????? ?????? ????????? ???? ????? ??? ????? ???? ????????? ???? ?????? ??? ?? ?????? ????? ???  ??????? ???? ????? ??????? ???? ???? ???????????  ????? ????????? ???? ????? ??? ????? ?????? ??????? ?????????? ????? ?????????? ???????? ?????? ?????? ????? ???????? ?????? ????? ??? ????????? ?????? ???????, ???????????????, ???????????? ? CT ???????????? ?????? ??? ??????? ??????? ???? ???? ????? ??? ???????? ?????? ????? ????????? ???? ??????????  ?????????? ?????? ?????? ????  ????? ?????????? ?????? ???? ????? ???? ??? ????? ????? ???? ?? ?? ???????? ??????? ???????? ???????? ?? ??????? ????? ???? ???????  ?????? ????????? ?????? ?????? ???????? ?????? ????? ??? ? ??????? ?????? ????? ???? ????? ??????? ???????? ????? ??????? ????? ?????? ?????????? ??????? ?????? ????? ???  ????????? ????????? ??????? ???????? ???????????, ????????, ??????? (????? ?????? ? ???????) , ????, ???????? ? ???????? ??????? ???? ??????????  ???????? ??????????? ??? ???????? ??????? ????? ???????, ????? ????????? ????????-?????? ????????? ? ????????? ?????? ???? ?????????? ????? ??????? ???????? ???? ???????? ???? ????? ???????  ??? ??????? ????? ?????????? ???, ??????? ????? ????????? ????? ?????????? ??????? ???? ????? 2 ??? ??? ??????? ???? ????? 1 ??? ??????? ??????? ????? ??????????  ??????? ??????? ???? ????? ??? ??????????  ????? ????????? ????? ?? ????????? ?? ???????? ???????? ??????? ??????????, ???:  ???????? ?????? ??? ?????? ??  ??????? ???????? ???? ?? ???? ???????? ?????  ??????? ??????????? ???? ????????????? ???? ???????? ??????? ????????? ? ??? ????? ???? ????? ????????? ??????? ???? ??????? ?????????:  ???????? ????? ?????  ???????? ???????? ???? ?? ????? ?????  ??????? ?????? ?????? (???? ???? ?? ????) ?? ????? ?????  ??????? ???????? ??????? ????????? ??

## 2025-02-12 NOTE — ANESTHESIA PRE PROCEDURE
08/02/24 61.7 kg (136 lb)   05/08/24 63.5 kg (140 lb)     There is no height or weight on file to calculate BMI.    CBC:   Lab Results   Component Value Date/Time    WBC 7.6 04/05/2024 12:11 PM    RBC 4.52 04/05/2024 12:11 PM    HGB 13.9 04/05/2024 12:11 PM    HCT 39.9 04/05/2024 12:11 PM    MCV 88.3 04/05/2024 12:11 PM    RDW 13.2 04/05/2024 12:11 PM     04/05/2024 12:11 PM       CMP:   Lab Results   Component Value Date/Time     04/05/2024 12:11 PM    K 4.3 04/05/2024 12:11 PM    K 3.4 12/03/2022 03:50 PM     04/05/2024 12:11 PM    CO2 25 04/05/2024 12:11 PM    BUN 14 04/05/2024 12:11 PM    CREATININE 1.1 04/05/2024 12:11 PM    GFRAA >60 06/24/2019 03:41 PM    AGRATIO 1.9 04/05/2024 12:11 PM    LABGLOM 81 04/05/2024 12:11 PM    GLUCOSE 208 04/05/2024 12:11 PM    CALCIUM 9.2 04/05/2024 12:11 PM    BILITOT 0.7 04/05/2024 12:11 PM    ALKPHOS 124 04/05/2024 12:11 PM    AST 37 04/05/2024 12:11 PM    ALT 40 04/05/2024 12:11 PM       POC Tests: No results for input(s): \"POCGLU\", \"POCNA\", \"POCK\", \"POCCL\", \"POCBUN\", \"POCHEMO\", \"POCHCT\" in the last 72 hours.    Coags: No results found for: \"PROTIME\", \"INR\", \"APTT\"    HCG (If Applicable): No results found for: \"PREGTESTUR\", \"PREGSERUM\", \"HCG\", \"HCGQUANT\"     ABGs: No results found for: \"PHART\", \"PO2ART\", \"ZUC1YQV\", \"NNJ9RCZ\", \"BEART\", \"G6GGCWHU\"     Type & Screen (If Applicable):  No results found for: \"ABORH\", \"LABANTI\"    Drug/Infectious Status (If Applicable):  Lab Results   Component Value Date/Time    HIV Non-Reactive 04/17/2023 12:04 PM       COVID-19 Screening (If Applicable): No results found for: \"COVID19\"        Anesthesia Evaluation  Patient summary reviewed  Airway: Mallampati: III  TM distance: >3 FB   Neck ROM: full  Mouth opening: > = 3 FB   Dental: normal exam   (+) poor dentition      Pulmonary:normal exam  breath sounds clear to auscultation                            ROS comment: Smoking Hx   Cardiovascular:  Exercise tolerance: good

## 2025-02-12 NOTE — H&P
Gastroenterology Note                 Pre-operative History and Physical    Patient: Krunal James  : 1970  CSN:     History Obtained From:   Patient or guardian.      HISTORY OF PRESENT ILLNESS:    The patient is a 55 y.o. male here for Endoscopy.      Past Medical History:    Past Medical History:   Diagnosis Date    Diabetes mellitus (HCC)     Hyperlipidemia     Laceration of left knee 2021    Learning disability     Open knee wound, left, initial encounter 2021    Seizure (HCC)     2022 one time occurrence     Past Surgical History:    Past Surgical History:   Procedure Laterality Date    COLONOSCOPY N/A 2023    COLONOSCOPY POLYPECTOMY SNARE/COLD BIOPSY performed by Gabino Restrepo MD at [facility] ENDOSCOPY    SIGMOIDOSCOPY N/A 2023    SIGMOIDOSCOPY DIAGNOSTIC FLEXIBLE performed by Marek Celis MD at [facility] ENDOSCOPY     Medications Prior to Admission:   No current facility-administered medications on file prior to encounter.     Current Outpatient Medications on File Prior to Encounter   Medication Sig Dispense Refill    hydrocortisone 1 % cream APPLY TOPICALLY 2 TIMES DAILY. 28.35 g 3    Omega 3 1000 MG CAPS Take 1,000 mg by mouth 2 times daily (with meals) 180 capsule 3    Blood Glucose Monitoring Suppl (BLOOD GLUCOSE MONITOR SYSTEM) w/Device KIT Test 1 time a day. Alternate between morning fasting blood sugar,  and bedtime (2 hours after eating). 1 kit 0    blood glucose monitor strips by Other route daily Test 1 time a day. Alternate between morning fasting blood sugar,  and bedtime (2 hours after eating). 50 strip 5    Lancets MISC 1 each by Does not apply route daily Test 1 time a day. Alternate between morning fasting blood sugar,  and bedtime (2 hours after eating). 300 each 5    Incontinence Supply Disposable (DEPEND FITTED BRIEFS SM/MED) MISC 1 each by Does not apply route in the morning, at noon, and at bedtime 90 each 5    metFORMIN (GLUCOPHAGE-XR)

## 2025-02-12 NOTE — ANESTHESIA POSTPROCEDURE EVALUATION
Department of Anesthesiology  Postprocedure Note    Patient: Krunal James  MRN: [id number]  YOB: 1970  Date of evaluation: 2/12/2025    Procedure Summary       Date: 02/12/25 Room / Location: [place] [address] / [facility]    Anesthesia Start: 1249 Anesthesia Stop: 1320    Procedure: COLONOSCOPY POLYPECTOMY SNARE/BIOPSY Diagnosis:       Colon cancer screening      (Colon cancer screening [Z12.11])    Surgeons: Gabino Restrepo MD Responsible Provider: Axel Ramirez DO    Anesthesia Type: MAC ASA Status: 3            Anesthesia Type: No value filed.    Eileen Phase I: Eileen Score: 10    Eileen Phase II:      Anesthesia Post Evaluation    Patient location during evaluation: PACU  Patient participation: complete - patient participated  Level of consciousness: awake and alert  Airway patency: patent  Nausea & Vomiting: no nausea  Cardiovascular status: hemodynamically stable  Respiratory status: acceptable  Hydration status: stable  Pain management: adequate    No notable events documented.

## 2025-02-17 ENCOUNTER — OFFICE VISIT (OUTPATIENT)
Dept: PRIMARY CARE CLINIC | Age: 55
End: 2025-02-17
Payer: COMMERCIAL

## 2025-02-17 VITALS
OXYGEN SATURATION: 97 % | SYSTOLIC BLOOD PRESSURE: 118 MMHG | RESPIRATION RATE: 16 BRPM | HEART RATE: 105 BPM | WEIGHT: 138.4 LBS | DIASTOLIC BLOOD PRESSURE: 78 MMHG | HEIGHT: 60 IN | BODY MASS INDEX: 27.17 KG/M2 | TEMPERATURE: 98.7 F

## 2025-02-17 DIAGNOSIS — E78.1 HIGH TRIGLYCERIDES: ICD-10-CM

## 2025-02-17 DIAGNOSIS — E78.2 MIXED HYPERLIPIDEMIA: ICD-10-CM

## 2025-02-17 DIAGNOSIS — E55.9 VITAMIN D DEFICIENCY: ICD-10-CM

## 2025-02-17 DIAGNOSIS — E11.9 TYPE 2 DIABETES MELLITUS WITHOUT COMPLICATION, WITHOUT LONG-TERM CURRENT USE OF INSULIN (HCC): Primary | ICD-10-CM

## 2025-02-17 DIAGNOSIS — G40.909 SEIZURE DISORDER (HCC): ICD-10-CM

## 2025-02-17 PROCEDURE — 4004F PT TOBACCO SCREEN RCVD TLK: CPT | Performed by: INTERNAL MEDICINE

## 2025-02-17 PROCEDURE — 2022F DILAT RTA XM EVC RTNOPTHY: CPT | Performed by: INTERNAL MEDICINE

## 2025-02-17 PROCEDURE — 3017F COLORECTAL CA SCREEN DOC REV: CPT | Performed by: INTERNAL MEDICINE

## 2025-02-17 PROCEDURE — 99214 OFFICE O/P EST MOD 30 MIN: CPT | Performed by: INTERNAL MEDICINE

## 2025-02-17 PROCEDURE — G8419 CALC BMI OUT NRM PARAM NOF/U: HCPCS | Performed by: INTERNAL MEDICINE

## 2025-02-17 PROCEDURE — G2211 COMPLEX E/M VISIT ADD ON: HCPCS | Performed by: INTERNAL MEDICINE

## 2025-02-17 PROCEDURE — G8427 DOCREV CUR MEDS BY ELIG CLIN: HCPCS | Performed by: INTERNAL MEDICINE

## 2025-02-17 PROCEDURE — 3046F HEMOGLOBIN A1C LEVEL >9.0%: CPT | Performed by: INTERNAL MEDICINE

## 2025-02-17 RX ORDER — METFORMIN HYDROCHLORIDE 500 MG/1
TABLET, EXTENDED RELEASE ORAL
Qty: 270 TABLET | Refills: 3 | Status: SHIPPED | OUTPATIENT
Start: 2025-02-17

## 2025-02-17 RX ORDER — OMEGA-3 FATTY ACIDS/FISH OIL 300-1000MG
1000 CAPSULE ORAL 2 TIMES DAILY WITH MEALS
Qty: 180 CAPSULE | Refills: 3 | Status: SHIPPED | OUTPATIENT
Start: 2025-02-17

## 2025-02-17 ASSESSMENT — ENCOUNTER SYMPTOMS
SHORTNESS OF BREATH: 0
BLOOD IN STOOL: 0
ABDOMINAL PAIN: 0
VOMITING: 0
EYE REDNESS: 0
CHEST TIGHTNESS: 0
ABDOMINAL DISTENTION: 0
TROUBLE SWALLOWING: 0
CONSTIPATION: 0
WHEEZING: 0
DIARRHEA: 0
BACK PAIN: 0
SINUS PRESSURE: 0
EYE PAIN: 0
COUGH: 0
COLOR CHANGE: 0
SORE THROAT: 0
NAUSEA: 0

## 2025-02-17 NOTE — PROGRESS NOTES
Krunal James (1970) is a 55 y.o. male   Follow-up (3 month)     General health:  This patient presents for check up and refills. The problem and medicine lists and chart were reviewed in detail. The patient has been worked up and treated for this/these condition/s and is compliant with taking the medication without any significant side effects. The patient's condition/s is/are chronic and unchanged and otherwise remains stable. Feels well with minor complaints.    Main Problem Review -T2 DM  -newly diagnosed diabetic. Patient's blood sugars have been stable. Patient denies complaints or symptoms today no polyuria or polydipsia. Patient mentions adherence with treatment.  Patient eats a low-carb diet, pays attention to food portion size.  Patient tries to exercise regularly.  Patient denies chest pain or shortness of breath with exertion or at rest.       Other problems review    1. HLD -  Patient denies complaints or symptoms of chest pain/shortness of breath with exertion or at rest.  Patient mentions they are adherent with treatment.  Patient follows a low-fat diet, tries to exercise.       Health Maintenance    Annual retinal eye exam - Patient due  will need to schedule   Annual Dentist visit -Patient due  will need to schedule   Tobacco smoking -  YES  Alcohol Misuse -  NO  Illicit Drug Use-   Depression screen -  Healthy Diet and physical activity - Tries to  Obesity Screen- screened  Wears seat belt- YES  End of life directives discussed with patient.-Mentions he does not have  a will/or/an advanced directives, an all his paperwork is in order. Was instructed to start process looking into preparing his will an advanced directives.       The 10-year ASCVD risk score (Gertrudis MURPHY, et al., 2019) is: 12.2%    Values used to calculate the score:      Age: 55 years      Sex: Male      Is Non- : No      Diabetic: Yes      Tobacco smoker: Yes      Systolic Blood Pressure: 118 mmHg      Is BP

## 2025-04-18 ENCOUNTER — OFFICE VISIT (OUTPATIENT)
Dept: PRIMARY CARE CLINIC | Age: 55
End: 2025-04-18
Payer: COMMERCIAL

## 2025-04-18 VITALS
BODY MASS INDEX: 26.5 KG/M2 | TEMPERATURE: 98 F | WEIGHT: 135 LBS | SYSTOLIC BLOOD PRESSURE: 120 MMHG | DIASTOLIC BLOOD PRESSURE: 80 MMHG | HEART RATE: 90 BPM | HEIGHT: 60 IN | OXYGEN SATURATION: 97 % | RESPIRATION RATE: 16 BRPM

## 2025-04-18 DIAGNOSIS — Z00.00 ENCOUNTER FOR WELL ADULT EXAM WITHOUT ABNORMAL FINDINGS: Primary | ICD-10-CM

## 2025-04-18 DIAGNOSIS — E55.9 VITAMIN D DEFICIENCY: ICD-10-CM

## 2025-04-18 DIAGNOSIS — R53.83 OTHER FATIGUE: ICD-10-CM

## 2025-04-18 DIAGNOSIS — E78.2 MIXED HYPERLIPIDEMIA: ICD-10-CM

## 2025-04-18 DIAGNOSIS — G40.909 SEIZURE DISORDER (HCC): ICD-10-CM

## 2025-04-18 DIAGNOSIS — E78.1 HIGH TRIGLYCERIDES: ICD-10-CM

## 2025-04-18 DIAGNOSIS — E11.9 TYPE 2 DIABETES MELLITUS WITHOUT COMPLICATION, WITHOUT LONG-TERM CURRENT USE OF INSULIN (HCC): ICD-10-CM

## 2025-04-18 DIAGNOSIS — Z00.00 ENCOUNTER FOR WELL ADULT EXAM WITHOUT ABNORMAL FINDINGS: ICD-10-CM

## 2025-04-18 DIAGNOSIS — E11.9 TYPE 2 DIABETES MELLITUS WITHOUT COMPLICATION, WITHOUT LONG-TERM CURRENT USE OF INSULIN: ICD-10-CM

## 2025-04-18 LAB
25(OH)D3 SERPL-MCNC: 33.9 NG/ML
ALBUMIN SERPL-MCNC: 4.5 G/DL (ref 3.4–5)
ALBUMIN/GLOB SERPL: 1.9 {RATIO} (ref 1.1–2.2)
ALP SERPL-CCNC: 100 U/L (ref 40–129)
ALT SERPL-CCNC: 41 U/L (ref 10–40)
ANION GAP SERPL CALCULATED.3IONS-SCNC: 9 MMOL/L (ref 3–16)
AST SERPL-CCNC: 38 U/L (ref 15–37)
BASOPHILS # BLD: 0.1 K/UL (ref 0–0.2)
BASOPHILS NFR BLD: 1.3 %
BILIRUB SERPL-MCNC: 1 MG/DL (ref 0–1)
BUN SERPL-MCNC: 15 MG/DL (ref 7–20)
CALCIUM SERPL-MCNC: 9.5 MG/DL (ref 8.3–10.6)
CHLORIDE SERPL-SCNC: 108 MMOL/L (ref 99–110)
CHOLEST SERPL-MCNC: 119 MG/DL (ref 0–199)
CO2 SERPL-SCNC: 26 MMOL/L (ref 21–32)
CREAT SERPL-MCNC: 1.2 MG/DL (ref 0.9–1.3)
CREAT UR-MCNC: 265 MG/DL (ref 39–259)
DEPRECATED RDW RBC AUTO: 13.2 % (ref 12.4–15.4)
EOSINOPHIL # BLD: 0.5 K/UL (ref 0–0.6)
EOSINOPHIL NFR BLD: 6.9 %
EST. AVERAGE GLUCOSE BLD GHB EST-MCNC: 137 MG/DL
GFR SERPLBLD CREATININE-BSD FMLA CKD-EPI: 72 ML/MIN/{1.73_M2}
GLUCOSE SERPL-MCNC: 119 MG/DL (ref 70–99)
HBA1C MFR BLD: 6.4 %
HCT VFR BLD AUTO: 40.7 % (ref 40.5–52.5)
HDLC SERPL-MCNC: 38 MG/DL (ref 40–60)
HGB BLD-MCNC: 13.8 G/DL (ref 13.5–17.5)
LDLC SERPL CALC-MCNC: 57 MG/DL
LYMPHOCYTES # BLD: 2.1 K/UL (ref 1–5.1)
LYMPHOCYTES NFR BLD: 31.3 %
MCH RBC QN AUTO: 30.4 PG (ref 26–34)
MCHC RBC AUTO-ENTMCNC: 33.8 G/DL (ref 31–36)
MCV RBC AUTO: 89.7 FL (ref 80–100)
MICROALBUMIN UR DL<=1MG/L-MCNC: <1.2 MG/DL
MICROALBUMIN/CREAT UR: ABNORMAL MG/G (ref 0–30)
MONOCYTES # BLD: 0.4 K/UL (ref 0–1.3)
MONOCYTES NFR BLD: 5.6 %
NEUTROPHILS # BLD: 3.7 K/UL (ref 1.7–7.7)
NEUTROPHILS NFR BLD: 54.9 %
PLATELET # BLD AUTO: 174 K/UL (ref 135–450)
PMV BLD AUTO: 8.8 FL (ref 5–10.5)
POTASSIUM SERPL-SCNC: 4.4 MMOL/L (ref 3.5–5.1)
PROT SERPL-MCNC: 6.9 G/DL (ref 6.4–8.2)
RBC # BLD AUTO: 4.54 M/UL (ref 4.2–5.9)
SODIUM SERPL-SCNC: 143 MMOL/L (ref 136–145)
TRIGL SERPL-MCNC: 121 MG/DL (ref 0–150)
TSH SERPL DL<=0.005 MIU/L-ACNC: 3.09 UIU/ML (ref 0.27–4.2)
VIT B12 SERPL-MCNC: 555 PG/ML (ref 211–911)
VLDLC SERPL CALC-MCNC: 24 MG/DL
WBC # BLD AUTO: 6.7 K/UL (ref 4–11)

## 2025-04-18 PROCEDURE — 99396 PREV VISIT EST AGE 40-64: CPT | Performed by: INTERNAL MEDICINE

## 2025-04-18 SDOH — ECONOMIC STABILITY: FOOD INSECURITY: WITHIN THE PAST 12 MONTHS, YOU WORRIED THAT YOUR FOOD WOULD RUN OUT BEFORE YOU GOT MONEY TO BUY MORE.: NEVER TRUE

## 2025-04-18 SDOH — ECONOMIC STABILITY: FOOD INSECURITY: WITHIN THE PAST 12 MONTHS, THE FOOD YOU BOUGHT JUST DIDN'T LAST AND YOU DIDN'T HAVE MONEY TO GET MORE.: NEVER TRUE

## 2025-04-18 ASSESSMENT — ENCOUNTER SYMPTOMS
SINUS PRESSURE: 0
EYE PAIN: 0
DIARRHEA: 0
COUGH: 0
WHEEZING: 0
SORE THROAT: 0
CHEST TIGHTNESS: 0
BLOOD IN STOOL: 0
TROUBLE SWALLOWING: 0
COLOR CHANGE: 0
VOMITING: 0
CONSTIPATION: 0
BACK PAIN: 0
NAUSEA: 0
ABDOMINAL PAIN: 0
EYE REDNESS: 0
ABDOMINAL DISTENTION: 0
SHORTNESS OF BREATH: 0

## 2025-04-18 ASSESSMENT — PATIENT HEALTH QUESTIONNAIRE - PHQ9
SUM OF ALL RESPONSES TO PHQ QUESTIONS 1-9: 0
1. LITTLE INTEREST OR PLEASURE IN DOING THINGS: NOT AT ALL
2. FEELING DOWN, DEPRESSED OR HOPELESS: NOT AT ALL

## 2025-04-18 NOTE — PATIENT INSTRUCTIONS
work to improve coping skills and learn other ways of dealing with stress or pain.   Mentoring and Community Help    Alcoholics Anonymous (AA) helps many people to stop drinking and stay sober. Members meet regularly and support each other. Your family members may also benefit from attending meetings of [place]. Living with an alcoholic can be a painful, stressful situation.   Here are some general statistics on treatment outcomes of individuals one year after attempting to stop drinkin/3 remained abstinent   1/3 resumed drinking but at a lower level   1/3 relapsed completely   If you are diagnosed with alcohol abuse or alcoholism, follow your doctor's instructions .   Prevention   Realizing that alcohol causes problems helps some people avoid it. Suggestions to decrease the risk of alcohol abuse and dependence include:   Socialize without alcohol.   Avoid going to bars.   Do not keep alcohol in your home.   Avoid situations and people that encourage drinking.   Make new nondrinking friends.   Do fun things that do not involve alcohol.   Avoid reaching for a drink when stressed or upset.   Limit your alcohol intake to a moderate level.   Moderate is two or fewer drinks per day for men and one or fewer for women and older adults   A 12-ounce bottle of beer, a five-ounce glass of wine, or 1.5 ounces of liquor is considered one drink   If you are a parent, having a good relationship with your children may reduce their risk of alcohol abuse.   Most professionals who treat alcohol abuse and dependence believe that complete abstinence is the only effective prevention.     Last Reviewed: 2010 Faraz Duran MD, PhD, MPH   Updated: 2010          Learning About Changing a Habit by Setting Goals  How can you change a habit?     If you've decided to change a habit--whether it's quitting smoking, lowering your blood pressure, becoming more active, or doing something else to improve your

## 2025-04-18 NOTE — CONSULTS
Session ID: [id number]  Language: [facility]   ID: #[id number]   Name: Kaitlynn

## 2025-04-18 NOTE — PROGRESS NOTES
Personalized Preventive Plan  Current Health Maintenance Status  Immunization History   Administered Date(s) Administered    COVID-19, MODERNA BLUE border, Primary or Immunocompromised, (age 12y+), IM, 100 mcg/0.5mL 06/21/2021, 07/20/2021    Influenza, FLUCELVAX, (age 6 mo+) IM, Trivalent PF, 0.5mL 11/08/2024    Pneumococcal, PCV20, PREVNAR 20, (age 6w+), IM, 0.5mL 04/05/2024    TDaP, ADACEL (age 10y-64y), BOOSTRIX (age 10y+), IM, 0.5mL 03/28/2021    Zoster Recombinant (Shingrix) 03/15/2023, 02/05/2024        Health Maintenance Due   Topic Date Due    Diabetic Alb to Cr ratio (uACR) test  Never done    Diabetic retinal exam  Never done    Hepatitis B vaccine (1 of 3 - 19+ 3-dose series) Never done    COVID-19 Vaccine (3 - 2024-25 season) 09/01/2024    Lipids  04/05/2025    GFR test (Diabetes, CKD 3-4, OR last GFR 15-59)  04/05/2025    Diabetic foot exam  05/08/2025      Recommendations for Preventive Services Due: see orders and patient instructions/AVS.

## 2025-04-19 ENCOUNTER — RESULTS FOLLOW-UP (OUTPATIENT)
Dept: PRIMARY CARE CLINIC | Age: 55
End: 2025-04-19

## 2025-05-18 PROBLEM — Z00.00 ENCOUNTER FOR WELL ADULT EXAM WITHOUT ABNORMAL FINDINGS: Status: RESOLVED | Noted: 2023-04-17 | Resolved: 2025-05-18

## 2025-05-19 DIAGNOSIS — E78.2 MIXED HYPERLIPIDEMIA: ICD-10-CM

## 2025-05-19 RX ORDER — ATORVASTATIN CALCIUM 40 MG/1
40 TABLET, FILM COATED ORAL NIGHTLY
Qty: 30 TABLET | Refills: 4 | Status: SHIPPED | OUTPATIENT
Start: 2025-05-19

## 2025-06-25 NOTE — PROGRESS NOTES
Dev #[id number]
Gamaliel Cordova (1970) is a 48 y.o. male   Cough and Follow-up     General health: This patient presents for check up and refills. The problem and medicine lists and chart were reviewed in detail. The patient has been worked up and treated for this/these condition/s and is compliant with taking the medication without any significant side effects. The patient's condition/s is/are chronic and unchanged and otherwise remains stable. Feels well with minor complaints. Main Problem Review - Seizure disorder-patient had an isolated seizure episode 12/3/2022 was seen in the emergency room. Patient had CT scan which was within normal limits. Patient has not had any other seizure episode. 2.  Hearing loss -patient wears bilateral hearing aids is at baseline. Patient follows with ENT. 3. HlD - Patient denies complaints or symptoms today. Patient mentions he is adherent with treatment. Health Maintenance    Annual retinal eye exam - Patient due  will need to schedule   Annual Dentist visit -Patient due  will need to schedule   Tobacco smoking -  YES  Alcohol Misuse -  NO  Illicit Drug Use-   Depression screen -  Healthy Diet and physical activity - Tries to  Obesity Screen- screened  Wears seat belt- YES  End of life directives discussed with patient. -Mentions he does not have  a will/or/an advanced directives, an all his paperwork is in order. Was instructed to start process looking into preparing his will an advanced directives.      The 10-year ASCVD risk score (Gertrudis MURPHY, et al., 2019) is: 10.8%    Values used to calculate the score:      Age: 48 years      Sex: Male      Is Non- : No      Diabetic: No      Tobacco smoker: Yes      Systolic Blood Pressure: 121 mmHg      Is BP treated: No      HDL Cholesterol: 38 mg/dL      Total Cholesterol: 214 mg/dL     Review of Systems   Constitutional:  Negative for activity change, appetite change, chills, fatigue, fever and unexpected weight
declines

## 2025-08-14 ENCOUNTER — OFFICE VISIT (OUTPATIENT)
Dept: PRIMARY CARE CLINIC | Age: 55
End: 2025-08-14
Payer: COMMERCIAL

## 2025-08-14 VITALS
TEMPERATURE: 98 F | WEIGHT: 134 LBS | HEART RATE: 84 BPM | HEIGHT: 60 IN | BODY MASS INDEX: 26.31 KG/M2 | SYSTOLIC BLOOD PRESSURE: 116 MMHG | DIASTOLIC BLOOD PRESSURE: 90 MMHG | OXYGEN SATURATION: 96 %

## 2025-08-14 DIAGNOSIS — E78.2 MIXED HYPERLIPIDEMIA: ICD-10-CM

## 2025-08-14 DIAGNOSIS — Z87.891 QUIT SMOKING: ICD-10-CM

## 2025-08-14 DIAGNOSIS — G40.909 SEIZURE DISORDER (HCC): ICD-10-CM

## 2025-08-14 DIAGNOSIS — E78.1 HIGH TRIGLYCERIDES: ICD-10-CM

## 2025-08-14 DIAGNOSIS — E11.9 TYPE 2 DIABETES MELLITUS WITHOUT COMPLICATION, WITHOUT LONG-TERM CURRENT USE OF INSULIN (HCC): Primary | ICD-10-CM

## 2025-08-14 DIAGNOSIS — R74.8 ELEVATED LIVER ENZYMES: ICD-10-CM

## 2025-08-14 DIAGNOSIS — E11.9 TYPE 2 DIABETES MELLITUS WITHOUT COMPLICATION, WITHOUT LONG-TERM CURRENT USE OF INSULIN (HCC): ICD-10-CM

## 2025-08-14 LAB
ALBUMIN SERPL-MCNC: 4.3 G/DL (ref 3.4–5)
ALBUMIN/GLOB SERPL: 1.9 {RATIO} (ref 1.1–2.2)
ALP SERPL-CCNC: 83 U/L (ref 40–129)
ALT SERPL-CCNC: 28 U/L (ref 10–40)
ANION GAP SERPL CALCULATED.3IONS-SCNC: 10 MMOL/L (ref 3–16)
AST SERPL-CCNC: 32 U/L (ref 15–37)
BILIRUB SERPL-MCNC: 0.7 MG/DL (ref 0–1)
BUN SERPL-MCNC: 22 MG/DL (ref 7–20)
CALCIUM SERPL-MCNC: 9.4 MG/DL (ref 8.3–10.6)
CHLORIDE SERPL-SCNC: 103 MMOL/L (ref 99–110)
CO2 SERPL-SCNC: 25 MMOL/L (ref 21–32)
CREAT SERPL-MCNC: 1.1 MG/DL (ref 0.9–1.3)
EST. AVERAGE GLUCOSE BLD GHB EST-MCNC: 148.5 MG/DL
GFR SERPLBLD CREATININE-BSD FMLA CKD-EPI: 80 ML/MIN/{1.73_M2}
GLUCOSE SERPL-MCNC: 250 MG/DL (ref 70–99)
HBA1C MFR BLD: 6.8 %
POTASSIUM SERPL-SCNC: 4.5 MMOL/L (ref 3.5–5.1)
PROT SERPL-MCNC: 6.6 G/DL (ref 6.4–8.2)
SODIUM SERPL-SCNC: 138 MMOL/L (ref 136–145)

## 2025-08-14 PROCEDURE — 3017F COLORECTAL CA SCREEN DOC REV: CPT | Performed by: INTERNAL MEDICINE

## 2025-08-14 PROCEDURE — 4004F PT TOBACCO SCREEN RCVD TLK: CPT | Performed by: INTERNAL MEDICINE

## 2025-08-14 PROCEDURE — G8427 DOCREV CUR MEDS BY ELIG CLIN: HCPCS | Performed by: INTERNAL MEDICINE

## 2025-08-14 PROCEDURE — 3044F HG A1C LEVEL LT 7.0%: CPT | Performed by: INTERNAL MEDICINE

## 2025-08-14 PROCEDURE — 99214 OFFICE O/P EST MOD 30 MIN: CPT | Performed by: INTERNAL MEDICINE

## 2025-08-14 PROCEDURE — G8419 CALC BMI OUT NRM PARAM NOF/U: HCPCS | Performed by: INTERNAL MEDICINE

## 2025-08-14 PROCEDURE — 2022F DILAT RTA XM EVC RTNOPTHY: CPT | Performed by: INTERNAL MEDICINE

## 2025-08-14 ASSESSMENT — ENCOUNTER SYMPTOMS
EYE PAIN: 0
WHEEZING: 0
ABDOMINAL PAIN: 0
BACK PAIN: 0
EYE REDNESS: 0
VOMITING: 0
ABDOMINAL DISTENTION: 0
SHORTNESS OF BREATH: 0
CHEST TIGHTNESS: 0
CONSTIPATION: 0
SINUS PRESSURE: 0
DIARRHEA: 0
COLOR CHANGE: 0
SORE THROAT: 0
NAUSEA: 0
COUGH: 0
BLOOD IN STOOL: 0
TROUBLE SWALLOWING: 0

## 2025-08-17 ENCOUNTER — RESULTS FOLLOW-UP (OUTPATIENT)
Dept: PRIMARY CARE CLINIC | Age: 55
End: 2025-08-17

## 2025-08-17 DIAGNOSIS — E11.9 TYPE 2 DIABETES MELLITUS WITHOUT COMPLICATION, WITHOUT LONG-TERM CURRENT USE OF INSULIN (HCC): Primary | ICD-10-CM

## 2025-09-04 DIAGNOSIS — E11.65 UNCONTROLLED TYPE 2 DIABETES MELLITUS WITH HYPERGLYCEMIA, WITHOUT LONG-TERM CURRENT USE OF INSULIN (HCC): ICD-10-CM

## 2025-09-04 DIAGNOSIS — E11.9 TYPE 2 DIABETES MELLITUS WITHOUT COMPLICATION, WITHOUT LONG-TERM CURRENT USE OF INSULIN (HCC): ICD-10-CM

## 2025-09-04 RX ORDER — LANCING DEVICE
EACH MISCELLANEOUS
Qty: 270 EACH | Refills: 1 | Status: SHIPPED | OUTPATIENT
Start: 2025-09-04 | End: 2025-09-04 | Stop reason: SDUPTHER

## 2025-09-04 RX ORDER — LANCING DEVICE
EACH MISCELLANEOUS
Qty: 204 EACH | Refills: 1 | Status: SHIPPED | OUTPATIENT
Start: 2025-09-04

## 2025-09-04 RX ORDER — METFORMIN HYDROCHLORIDE 500 MG/1
TABLET, EXTENDED RELEASE ORAL
Qty: 270 TABLET | Refills: 1 | Status: SHIPPED | OUTPATIENT
Start: 2025-09-04

## (undated) DEVICE — ENDOSCOPIC KIT 6X3/16 FT COLON W/ 1.1 OZ 2 GWN W/O BRSH

## (undated) DEVICE — TRAP SPEC RETRV CLR PLAS POLYP IN LN SUCT QUIK CTCH

## (undated) DEVICE — SNARE COLD DIAMOND 15MM THIN

## (undated) DEVICE — AIR/WATER CLEANING ADAPTER FOR OLYMPUS® GI ENDOSCOPE: Brand: BULLDOG®

## (undated) DEVICE — BW-412T DISP COMBO CLEANING BRUSH: Brand: SINGLE USE COMBINATION CLEANING BRUSH

## (undated) DEVICE — VALVE SUCTION AIR H2O SET ORCA POD + DISP

## (undated) DEVICE — SOLUTION IRRIG 500ML STRL H2O NONPYROGENIC

## (undated) DEVICE — SOLUTION IV IRRIG WATER 500ML POUR BRL ST 2F7113

## (undated) DEVICE — SNARES COLD OVAL 10MM THIN

## (undated) DEVICE — SINGLE USE AIR/WATER, SUCTION AND BIOPSY VALVES SET: Brand: ORCAPOD™